# Patient Record
Sex: FEMALE | Race: WHITE
[De-identification: names, ages, dates, MRNs, and addresses within clinical notes are randomized per-mention and may not be internally consistent; named-entity substitution may affect disease eponyms.]

---

## 2017-02-20 ENCOUNTER — HOSPITAL ENCOUNTER (OUTPATIENT)
Dept: HOSPITAL 39 - GMAB | Age: 52
Discharge: HOME | End: 2017-02-20
Attending: FAMILY MEDICINE
Payer: COMMERCIAL

## 2017-02-20 DIAGNOSIS — N39.0: Primary | ICD-10-CM

## 2017-09-19 ENCOUNTER — HOSPITAL ENCOUNTER (OUTPATIENT)
Dept: HOSPITAL 39 - GMAB | Age: 52
Discharge: HOME | End: 2017-09-19
Attending: FAMILY MEDICINE
Payer: COMMERCIAL

## 2017-09-19 DIAGNOSIS — Z00.01: Primary | ICD-10-CM

## 2019-01-22 ENCOUNTER — HOSPITAL ENCOUNTER (OUTPATIENT)
Dept: HOSPITAL 39 - LAB.O | Age: 54
End: 2019-01-22
Attending: NURSE PRACTITIONER
Payer: COMMERCIAL

## 2019-01-22 DIAGNOSIS — I10: Primary | ICD-10-CM

## 2019-12-17 ENCOUNTER — HOSPITAL ENCOUNTER (EMERGENCY)
Dept: HOSPITAL 39 - ER | Age: 54
Discharge: HOME | End: 2019-12-17
Payer: COMMERCIAL

## 2019-12-17 VITALS — SYSTOLIC BLOOD PRESSURE: 159 MMHG | DIASTOLIC BLOOD PRESSURE: 79 MMHG

## 2019-12-17 VITALS — TEMPERATURE: 97.4 F | OXYGEN SATURATION: 100 %

## 2019-12-17 DIAGNOSIS — N20.0: ICD-10-CM

## 2019-12-17 DIAGNOSIS — N28.1: ICD-10-CM

## 2019-12-17 DIAGNOSIS — I34.1: ICD-10-CM

## 2019-12-17 DIAGNOSIS — R10.84: Primary | ICD-10-CM

## 2019-12-17 DIAGNOSIS — J45.909: ICD-10-CM

## 2019-12-17 DIAGNOSIS — I10: ICD-10-CM

## 2019-12-17 DIAGNOSIS — Z85.3: ICD-10-CM

## 2019-12-17 DIAGNOSIS — Z79.899: ICD-10-CM

## 2019-12-17 PROCEDURE — 74177 CT ABD & PELVIS W/CONTRAST: CPT

## 2019-12-17 PROCEDURE — 36415 COLL VENOUS BLD VENIPUNCTURE: CPT

## 2019-12-17 PROCEDURE — 85025 COMPLETE CBC W/AUTO DIFF WBC: CPT

## 2019-12-17 PROCEDURE — 83690 ASSAY OF LIPASE: CPT

## 2019-12-17 PROCEDURE — 81001 URINALYSIS AUTO W/SCOPE: CPT

## 2019-12-17 PROCEDURE — 80053 COMPREHEN METABOLIC PANEL: CPT

## 2019-12-17 NOTE — ED.PDOC
History of Present Illness





- General


Chief Complaint: Abdominal Pain


Stated Complaint: abd cramping x3 hrs


Time Seen by Provider: 12/17/19 20:27


Information Source: patient, family


Exam Limitations: no limitations





- History of Present Illness


Initial Comments: 





53 yo F with PMH sig for chronic constipation on linzess and bentyl daily who 

presents for diffuse abd pain described as cramping onset this evening, 

worsening since, severe, constant, no radiation. Similar pain in past with 

diverticulitis. Last bowel movement this morning. Denies f/c, cough, congestion,

CP, SOB, n/v/d, urinary sx. 





Review of Systems





- Review of Systems


Constitutional: Denies: chills, fever


EENTM: States: no symptoms reported


Respiratory: Denies: cough, short of breath


Cardiology: Denies: chest pain, palpitations


Gastrointestinal/Abdominal: States: abdominal pain, constipation.  Denies: 

diarrhea, nausea, vomiting


Genitourinary: Denies: dysuria, frequency, hematuria


Musculoskeletal: Denies: back pain, neck pain


Skin: Denies: lesions, rash


Neurological: Denies: headache, numbness, weakness





Past Medical History (General)





- Patient Medical History


Hx Seizures: No


Hx Stroke: No


Hx Dementia: No


Hx Asthma: Yes - seasonal: asthma


Hx of COPD: No


Hx Cardiac Disorders: Yes - mitral valve prolapse


Hx Congestive Heart Failure: No


Hx Pacemaker: No


Hx Hypertension: Yes


Hx Thyroid Disease: No


Hx Diabetes: No


Hx Gastroesophageal Reflux: No


Hx Renal Disease: No


Hx Cancer: Yes - breast


Hx of HIV: No


Hx Hepatitis C: No


Hx MRSA: No





- Vaccination History


Hx Tetanus, Diphtheria Vaccination: No


Hx Influenza Vaccination: Yes


Hx Pneumococcal Vaccination: No





- Social History


Hx Tobacco Use: No


Hx Alcohol Use: No


Hx Substance Use: No


Hx Substance Use Treatment: No


Hx Depression: No


Hx Physical Abuse: No


Hx Emotional Abuse: No


Hx Suspected Abuse: No





- Female History


Patient Pregnant: No





Family Medical History





- Family History


  ** Mother


Living Status: Still Living


Hx Family Cancer: Yes - breast





Physical Exam





- Physical Exam


General Appearance: Alert, No apparent distress, Well Developed, Well Nourished,

Other - Appears in pain


Eyes, Ears, Nose, Throat Exam: normal ENT inspection


Neck: full range of motion, supple


Respiratory: lungs clear, normal breath sounds, no respiratory distress, no 

accessory muscle use


Cardiovascular/Chest: normal peripheral pulses, regular rate, rhythm, no edema, 

no gallop, no JVD, no murmur


Peripheral Pulses: No deficit


Gastrointestinal/Abdominal: normal bowel sounds, soft, no organomegaly, no 

pulsatile mass, tenderness - diffuse


Back Exam: no CVA tenderness


Extremity: normal range of motion, no pedal edema


Neurologic: no motor/sensory deficits, alert, normal mood/affect, other - 

grossly intact


Skin Exam: normal color, warm/dry





Progress





- Progress


Progress: 


I have explained and reviewed all results with the pt. Pt is feeling improved, 

comfortable with d/c home. I explained that emergent conditions may


arise and to return to the ER for new, worsening, or any persistent conditions. 

I've explained the importance of f/u for recheck. All questions and concerns 

addressed at this time. Pt understands and agrees with plan. Pt well appearing, 

NAD, is stable for discharge.





Liza Manzanares MD


Emergency Medicine Physician


Billing Number [id number] 





- Results/Orders


Results/Orders: 





                                        





12/17/19 20:43


Hold Metformin x 48Hrs [id number] 








                         Laboratory Results - last 24 hr











  12/17/19 12/17/19 12/17/19





  20:39 20:39 20:39


 


WBC  5.1  


 


RBC  4.36  


 


Hgb  13.5  


 


Hct  40.1  


 


MCV  92.0  


 


MCH  31.0  


 


MCHC  33.7  


 


RDW  12.7  


 


Plt Count  226  


 


MPV  8.2  


 


Absolute Neuts (auto)  3.30  


 


Absolute Lymphs (auto)  1.30  


 


Absolute Monos (auto)  0.40  


 


Absolute Eos (auto)  0.10  


 


Absolute Basos (auto)  0.00  


 


Neutrophils %  63.9  


 


Lymphocytes %  25.9  


 


Monocytes %  7.8  


 


Eosinophils %  1.5  


 


Basophils %  0.9  


 


Sodium   134 L 


 


Potassium   3.3 L 


 


Chloride   99 L 


 


Carbon Dioxide   26 


 


Anion Gap   12.3 


 


BUN   13 


 


Creatinine   0.73 


 


BUN/Creatinine Ratio   17.8 


 


Random Glucose   123 H 


 


Serum Osmolality   269.7 L 


 


Calcium   9.5 


 


Total Bilirubin   0.6 


 


AST   28 


 


ALT   22 


 


Alkaline Phosphatase   64 


 


Serum Total Protein   6.8 


 


Albumin   4.4 


 


Globulin   2.4 


 


Albumin/Globulin Ratio   1.8 


 


Lipase    39


 


Urine Color   


 


Urine Appearance   


 


Urine pH   


 


Ur Specific Gravity   


 


Urine Protein   


 


Urine Glucose (UA)   


 


Urine Ketones   


 


Urine Blood   


 


Urine Nitrite   


 


Urine Bilirubin   


 


Urine Urobilinogen   


 


Ur Leukocyte Esterase   


 


Urine RBC   


 


Urine WBC   


 


Ur Epithelial Cells   


 


Urine Bacteria   














  12/17/19





  20:40


 


WBC 


 


RBC 


 


Hgb 


 


Hct 


 


MCV 


 


MCH 


 


MCHC 


 


RDW 


 


Plt Count 


 


MPV 


 


Absolute Neuts (auto) 


 


Absolute Lymphs (auto) 


 


Absolute Monos (auto) 


 


Absolute Eos (auto) 


 


Absolute Basos (auto) 


 


Neutrophils % 


 


Lymphocytes % 


 


Monocytes % 


 


Eosinophils % 


 


Basophils % 


 


Sodium 


 


Potassium 


 


Chloride 


 


Carbon Dioxide 


 


Anion Gap 


 


BUN 


 


Creatinine 


 


BUN/Creatinine Ratio 


 


Random Glucose 


 


Serum Osmolality 


 


Calcium 


 


Total Bilirubin 


 


AST 


 


ALT 


 


Alkaline Phosphatase 


 


Serum Total Protein 


 


Albumin 


 


Globulin 


 


Albumin/Globulin Ratio 


 


Lipase 


 


Urine Color  Yellow


 


Urine Appearance  Clear


 


Urine pH  7.5


 


Ur Specific Gravity  1.020


 


Urine Protein  Negative


 


Urine Glucose (UA)  Negative


 


Urine Ketones  Negative


 


Urine Blood  Trace-intact H


 


Urine Nitrite  Negative


 


Urine Bilirubin  Negative


 


Urine Urobilinogen  0.2


 


Ur Leukocyte Esterase  Negative


 


Urine RBC  0-1


 


Urine WBC  0


 


Ur Epithelial Cells  0-1


 


Urine Bacteria  Rare








CT abd/pelvis: 


EXAM DESCRIPTION:  Abdomen/Pelvis w/Contrast  CLINICAL HISTORY:  54 years Female

pain  COMPARISON:  July 8, 2016.  TECHNIQUE:  Images were obtained in axial, 

sagittal, and coronal planes. Intravenous contrast was administered.  This exam 

was performed according to our departmental dose-optimization program which 

includes use of Automated Exposure Control, adjustment of the mA and/or kV 

according to patient size and/or use of iterative reconstruction technique.  

FINDINGS:  No abnormality involving the liver, spleen, pancreas, gallbladder, or

adrenal glands bilaterally.  No obstructing renal calcifications bilaterally. No

hydronephrosis bilaterally. Right renal cysts. Punctate nonobstructing 

calcifications left kidney. Unremarkable bladder.  No dilatation abdominal 

aorta. No abnormality portal vein. No adenopathy or abnormal fluid collections 

seen.  Appendix within normal limits. No bowel obstruction, perforation, or 

inflammation. No acute osseous abnormality.  No abnormality lower lungs 

bilaterally.    IMPRESSION:  No acute intra-abdominal abnormality.  

Electronically signed by: Anabela Christine MD 12/17/2019 9:25 PM CST Workstation: 

[id number]





                               Vital Signs - 24 hr











  12/17/19 12/17/19 12/17/19





  20:33 21:43 22:00


 


Temperature 97.4 F L  


 


Pulse Rate [ 60 64 66





left]   


 


Respiratory 18 18 





Rate   


 


Blood Pressure 189/106 162/87 159/79





[left]   


 


O2 Sat by Pulse 100 100 





Oximetry   














  12/17/19





  22:21


 


Temperature 97.4 F L


 


Pulse Rate [ 66





left] 


 


Respiratory 18





Rate 


 


Blood Pressure 159/79





[left] 


 


O2 Sat by Pulse 100





Oximetry 














Departure





- Departure


Clinical Impression: 


 Acute abdominal pain





Time of Disposition: 22:09


Disposition: Discharge to Home or Self Care


Health Concerns: 


condition: stable


Departure Forms:  ED Discharge - Pt. Copy, Patient Portal Self Enrollment


Instructions:  DI for Abdominal Pain-Adult


Referrals: 


PANCHO YUAN MD [Primary Care Provider] - 1-2 Days


Home Medications: 


Ambulatory Orders





Calcium 2,000 mg PO DAILY 09/14/14 


RX: ALPRAZolam [Xanax] 0.5 mg PO TID 09/14/14 


RX: Enalapril Maleate [Vasotec] 10 mg PO DAILY 09/14/14 


Vitamin D 1 each PO DAILY 09/14/14 


RX: Linaclotide [Linzess] 145 mcg PO DAILY PRN 07/09/16 


Omeprazole Magnesium [Prilosec Otc] 20 mg PO QAM #30 tab 07/10/16 


RX: Bifidobacterium Infantis [Align] 4 mg PO BID #60 cap 07/10/16 


RX: Dicyclomine HCl [Bentyl] 10 mg PO TID #60 tab 07/10/16 


RX: HYDROcodone 5MG/APAP 325MG [Norco 5/325] 1 ea PO Q4H PRN #0 tab 07/10/16 








Additional Instructions: 


Follow up:


[facility]


As needed, if symptoms worsen

## 2019-12-17 NOTE — CT
EXAM DESCRIPTION:



 Abdomen/Pelvis w/Contrast



CLINICAL HISTORY: 



54 years  Female  pain



COMPARISON: 



July 8, 2016.



TECHNIQUE: 



Images were obtained in axial, sagittal, and coronal planes.

Intravenous contrast was administered.



This exam was performed according to our departmental

dose-optimization program which includes use of Automated

Exposure Control, adjustment of the mA and/or kV according to

patient size and/or use of iterative reconstruction technique. 



FINDINGS: 



No abnormality involving the liver, spleen, pancreas,

gallbladder, or adrenal glands bilaterally.



No obstructing renal calcifications bilaterally. No

hydronephrosis bilaterally. Right renal cysts. Punctate

nonobstructing calcifications left kidney. Unremarkable bladder.



No dilatation abdominal aorta. No abnormality portal vein. No

adenopathy or abnormal fluid collections seen.



Appendix within normal limits. No bowel obstruction, perforation,

or inflammation. No acute osseous abnormality.



No abnormality lower lungs bilaterally.







IMPRESSION: 



No acute intra-abdominal abnormality.



Electronically signed by:  Anabela Christine MD  12/17/2019 9:25 PM CST

Workstation: [id number]

## 2019-12-19 ENCOUNTER — HOSPITAL ENCOUNTER (INPATIENT)
Dept: HOSPITAL 39 - ER | Age: 54
LOS: 4 days | Discharge: HOME | DRG: 389 | End: 2019-12-23
Attending: NURSE PRACTITIONER | Admitting: NURSE PRACTITIONER
Payer: COMMERCIAL

## 2019-12-19 DIAGNOSIS — R65.10: ICD-10-CM

## 2019-12-19 DIAGNOSIS — E78.5: ICD-10-CM

## 2019-12-19 DIAGNOSIS — I10: ICD-10-CM

## 2019-12-19 DIAGNOSIS — J45.909: ICD-10-CM

## 2019-12-19 DIAGNOSIS — K56.609: Primary | ICD-10-CM

## 2019-12-19 DIAGNOSIS — N17.9: ICD-10-CM

## 2019-12-19 DIAGNOSIS — K52.9: ICD-10-CM

## 2019-12-19 DIAGNOSIS — E86.0: ICD-10-CM

## 2019-12-19 DIAGNOSIS — D72.829: ICD-10-CM

## 2019-12-19 DIAGNOSIS — I34.1: ICD-10-CM

## 2019-12-19 DIAGNOSIS — M50.30: ICD-10-CM

## 2019-12-19 DIAGNOSIS — Z85.3: ICD-10-CM

## 2019-12-19 RX ADMIN — POTASSIUM CHLORIDE, DEXTROSE MONOHYDRATE AND SODIUM CHLORIDE PRN MLS/HR: 150; 5; 900 INJECTION, SOLUTION INTRAVENOUS at 20:29

## 2019-12-19 RX ADMIN — Medication SCH: at 20:01

## 2019-12-19 RX ADMIN — PROMETHAZINE HYDROCHLORIDE PRN MLS/HR: 25 INJECTION INTRAMUSCULAR; INTRAVENOUS at 20:10

## 2019-12-19 RX ADMIN — HYDROMORPHONE HYDROCHLORIDE PRN MG: 2 INJECTION, SOLUTION INTRAMUSCULAR; INTRAVENOUS; SUBCUTANEOUS at 21:30

## 2019-12-19 RX ADMIN — ENOXAPARIN SODIUM SCH MG: 40 INJECTION, SOLUTION INTRAVENOUS; SUBCUTANEOUS at 20:01

## 2019-12-19 RX ADMIN — PANTOPRAZOLE SODIUM SCH MG: 40 INJECTION, POWDER, FOR SOLUTION INTRAVENOUS at 17:15

## 2019-12-19 RX ADMIN — PIPERACILLIN SODIUM AND TAZOBACTAM SODIUM SCH MLS/HR: 3; .375 INJECTION, POWDER, LYOPHILIZED, FOR SOLUTION INTRAVENOUS at 22:17

## 2019-12-19 NOTE — HP
SUPERVISING PHYSICIAN:  Abram Persaud M.D.



CHIEF COMPLAINT:  Abdominal pain with nausea and vomiting.



HISTORY OF PRESENT ILLNESS:  This is a 54 year-old female patient who came to 
the Emergency Room secondary to nausea and vomiting with abdominal pain for 24 
to 36 hours.  She was actually seen in the Emergency Room here on the  with 
some abdominal pain but was not having any vomiting at that time.  She had an 
extensive workup including labs and CT that were unremarkable.  She presented 
back to the Emergency Room today secondary to increasing abdominal pain as well 
as nausea and vomiting.  She does not have any history of bowel obstruction.  
She does have a history of significant constipation.  She has seen a 
gastroenterologist in the past, but showed no definitive pathology.  She has a 
past history of breast cancer with a left mastectomy with no recurring issues.  
Initially in the Emergency Room her vital signs were temperature 96.6, heart 
rate 83, blood pressure 154/106, respiratory rate 20, O2 saturation 99%.  Lab 
was done that showed a WBC of 15,500 with hemoglobin 17.9, hematocrit 53.4.  D-
dimer was negative at 0.19.  Sodium was low at 125 with potassium 3.8, chloride 
81, BUN 42, creatinine 1.47.  Blood glucose was 137 with serum osmolality of 
264.1, lactic acid 3.9, calcium 10.3.  Total bilirubin was 1.1 with AST of 70, 
creatinine kinase 673, CK-MB 24.9.  Troponin was elevated at 0.2 but she had no 
chest pain or EKG changes.  Urinalysis was unremarkable.  Blood cultures were 
drawn.  Influenza A and B via PCR were both negative.  Abdominal x-ray showed 
nondistended loops of small bowel with air-fluid levels, may be related to 
enteritis. Developing bowel obstruction not completely excluded.  



Abdomen and pelvis CT shows:

1.   Abnormal small bowel pattern is observed consistent with small bowel 
obstruction.

2.   Small nonobstructing left renal calculi are observed.

3.   Uncomplicated diverticulosis of the colon.

4.   Bilateral L5 spondylosis with grade 1 spondylolisthesis.



She was given some Zosyn and several liters of fluids in the Emergency Room as 
well as some Promethazine for her nausea.  Dr. Behr was consulted by the 
Emergency Room physician and he agreed for consultation on her admission.  I was
called for hospital admission.



PAST MEDICAL HISTORY: 

1.   Seasonal allergies.

2.   Migraine headaches.

3.   Hyperlipidemia.

4.   Hypertension.

5.   Palpitations.

6.   Cervical disc disease.

7.   Left breast cancer that has resolved.



PAST SURGICAL HISTORY:

1.    section.

2.   Hysterectomy.

3.   Hernia repair in .

4.   Left mastectomy.



OUTPATIENT MEDICATIONS:  

1.   Alprazolam.

2.   Calcium.

3.   Dicyclomine.

4.   Enalapril.

5.   Linzess.

6.   Vitamin D.



ALLERGIES:  NO KNOWN DRUG ALLERGIES.



FAMILY HISTORY:  Noncontributory.



SOCIAL HISTORY:  She is .  She has 2 children.  She is employed at 
[employer].  She denies any smoking or illicit 
drug use.  She does drink alcohol on a social basis.



REVIEW OF SYSTEMS:  

GENERAL:  Positive for fatigue.  Negative for fever or weight changes.

HEENT:  Negative for sinus symptoms, ear pain, vision changes or sore throat.  

RESPIRATORY:  Negative for wheezing, coughing or shortness of breath.  

CARDIAC:  Negative for chest pain, palpitations or tachycardia.  

GASTROINTESTINAL:  As per History of Present Illness.

GENITOURINARY:  Negative for hematuria, dysuria or polyuria.

MUSCULOSKELETAL:  Positive for muscle cramping.  Negative for arthralgias or 
myalgias.  

SKIN:  Negative for lesions or rashes.  

NEUROLOGIC:  Negative for headache, dizziness or seizures.  



PHYSICAL EXAMINATION: 



VITAL SIGNS:  Temperature 97.8, heart rate 90, blood pressure 155/90, 
respiratory rate 15, O2 saturation 96% on room air.



GENERAL:  This is a  54 year-old female patient lying in her hospital bed.  She 
looks to be moderately ill.



HEENT:  Normocephalic, atraumatic.  Pupils are equal and reactive.  Oropharynx 
is clear.  Oral mucous membranes are very dry.



NECK:  Supple without mass. 



RESPIRATORY: Essentially clear to auscultation bilaterally.  



CHEST:  There is equal rise and fall of the chest with inspiration and 
expiration.  



CARDIOVASCULAR:  Regular rate and rhythm.  



GASTROINTESTINAL: Abdomen is soft, nondistended.  It is diffusely tender in all 
four quadrants, especially in the left upper and right upper quadrants.  There 
is no rebound tenderness or guarding.  Bowel sounds are positive.  



EXTREMITIES:  No cyanosis, clubbing or edema.  



SKIN:  Warm and dry, although she has poor skin turgor.



NEUROLOGIC: Awake, alert and oriented times three.  Cranial nerves II-XII are 
grossly intact as tested.



LABORATORY:  Labs and films are as per the History of Present Illness.



ASSESSMENT: 

1.   Small bowel obstruction.

2.   Sudden inflammatory response syndrome secondary to possible enteritis.  She

      has admitting WBCs of 15,500 and elevated lactic acid.

3.   Dehydration secondary to nausea and vomiting as well as #1.

4.   Hypertension.

5.   Chronic cervical neck pain.

6.   Hyperlipidemia.

7.   History of breast cancer with left mastectomy.



PLAN:  The patient has been admitted to the hospital.  She will continue on her 
Zosyn as ordered in the Emergency Room.  Will give her fluids overnight.  Dr. Behr has been consulted and her GI orders will defer to him.  She will be on 
bowel rest.  She has antiemetics for nausea.  She is also on a proton pump 
inhibitor for ulcer prophylaxis as well as Lovenox for deep venous thrombosis 
prophylaxis.  At this point, she has not required any pain medications.  Will 
follow that as needed.  Routine lab has been ordered for in the morning as well 
as an abdominal x-ray.  She will have incentive spirometry.  Will follow her 
cultures as they become available.  Will continue to monitor closely and follow 
as needed.



#[id number]

[facility]

## 2019-12-19 NOTE — RAD
Procedure:  XR ABDOMEN SUPINE AND ERECT WITH CHEST (ABD ACUTE

SERIES)        



Exam Date:  12/19/2019



Ordering Provider:  Edy Kessler



Clinical Indication:  abd pain, nv



Comparison: 12/17/2019 CT abdomen pelvis



Findings: 



Upright chest x-ray: 

Cardiomediastinal silhouette is unremarkable.

Pulmonary vasculature is unremarkable.

There is no consolidation or effusion.

No pneumothorax.



Flat and upright abdomen: 

Nondistended loops of bowel with air-fluid levels. 

There is no pneumoperitoneum. 

There are no suspicious calcifications. 

No acute osseous abnormalities.



Impression:

1. Nondistended loops of small bowel with air-fluid levels may be

related to enteritis, developing bowel obstruction not completely

excluded.



Electronically signed by:  Tien Franklin MD  12/19/2019 1:43 PM CST

Workstation: [id number]

## 2019-12-19 NOTE — CT
EXAM DESCRIPTION:

Abdoment/Pelvis w/o Contrast



CLINICAL HISTORY:

54 years Female, worsening abd pain, nv, leukocytosis



COMPARISON:

17 December 2019



TECHNIQUE:

Transaxial images were obtained without intravenous or oral

contrast media. Sagittal and coronal reconstruction was

performed.This exam was performed according to our departmental

dose-optimization program, which includes automated exposure

control, adjustment of the mA and/or kV according to patient size

and/or use of iterative reconstruction technique.







FINDINGS:

The lung bases are clear. The liver and spleen are unremarkable.

No biliary ductal dilatation is observed. No adrenal masses are

detected. The pancreas is normal in appearance. Imaging of the

right kidney reveals no evidence of hydronephrosis mass cyst or

calcification. Imaging of the left kidney reveals 3 small stones

the largest of which measures 3.9 mm in diameter. No

hydronephrosis or mass is detected. Marked distention of the

stomach and proximal small bowel is observed. The appearance is

that of a small bowel obstruction. Minimal diverticulosis of the

colon is observed without evidence of diverticulitis.

Degenerative changes are observed in the lower lumbar spine. A

bilateral L5 spondylolysis is observed with grade 1

spondylolisthesis. Patient is post hysterectomy.



IMPRESSION:



1. An abnormal small bowel pattern is observed consistent with

small bowel obstruction.

2. Small nonobstructing left renal calculi are observed.

3. Uncomplicated diverticulosis of the colon.

4. Bilateral L5 spondylolysis with grade 1 spondylolisthesis.



Electronically signed by:  Beto Lau MD  12/19/2019 3:14 PM

CST Workstation: [id number]

## 2019-12-19 NOTE — ED.PDOC
History of Present Illness





- General


Chief Complaint: Abdominal Pain


Stated Complaint: abd pain with n/v


Time Seen by Provider: 12/19/19 13:07


Source: patient


Exam Limitations: no limitations





- History of Present Illness


Initial Comments: 





the patient is a 54-year-old  female presenting to the emergency room 

secondary to nausea vomiting abdominal pain for the last 24-36 hours.  She was 

actually seen here in the emergency room on the 17th for some abdominal pain but

was not having any vomiting at that time.  A rather extensive workup including 

labs and CT scan were done at that time that were grossly normal.  The patient 

presents today secondary to the vomiting and increasing pain.  No history of any

bowel obstruction.  She does have a history of significant constipation and 

irritable bowel.  She has apparently seen gastroenterology in the [facility] and 

had a significant workup recently not showing any other definitive pathology.  

No fever.  No chest pain.  She does have a history of breast cancer that is 

apparently resolved.  The patient is pleasant and cooperative.  Diffuse 

abdominal discomfort palpation and mild to moderate distention.  No guarding.  N

o real point tenderness.


Timing/Duration: 24 hours


Severity: moderate


Improving Factors: nothing


Worsening Factors: eating


Associated Symptoms: loss of appetite, malaise, nausea/vomiting


Allergies/Adverse Reactions: 


Allergies





NO KNOWN ALLERGY Allergy (Verified 12/19/19 13:24)


   





Home Medications: 


Ambulatory Orders





ALPRAZolam [Xanax] 0.5 mg PO TID 09/14/14 


Calcium 2,000 mg PO DAILY 09/14/14 


Enalapril Maleate [Vasotec] 10 mg PO DAILY 09/14/14 


Vitamin D 1 each PO DAILY 09/14/14 


Linaclotide [Linzess] 145 mcg PO DAILY PRN 07/09/16 


Bifidobacterium Infantis [Align] 4 mg PO BID #60 cap 07/10/16 


Dicyclomine HCl [Bentyl] 10 mg PO TID #60 tab 07/10/16 


HYDROcodone 5MG/APAP 325MG [Norco 5/325] 1 ea PO Q4H PRN #0 tab 07/10/16 


Omeprazole Magnesium [Prilosec Otc] 20 mg PO QAM #30 tab 07/10/16 











Review of Systems





- Review of Systems


Constitutional: States: malaise, weakness - generalized


EENTM: States: no symptoms reported


Respiratory: States: no symptoms reported


Cardiology: States: no symptoms reported


Gastrointestinal/Abdominal: States: see HPI


Genitourinary: States: no symptoms reported


Musculoskeletal: States: other - increased muscle cramps


Skin: States: no symptoms reported


Neurological: States: no symptoms reported


Endocrine: States: no symptoms reported


All other Systems: No Change from Baseline





Past Medical History (General)





- Patient Medical History


Hx Seizures: No


Hx Stroke: No


Hx Dementia: No


Hx Asthma: Yes - seasonal: asthma


Hx of COPD: No


Hx Cardiac Disorders: Yes - mitral valve prolapse


Hx Congestive Heart Failure: No


Hx Pacemaker: No


Hx Hypertension: Yes


Hx Thyroid Disease: No


Hx Diabetes: No


Hx Gastroesophageal Reflux: No


Hx Renal Disease: No


Hx Cancer: Yes - breast left


Hx of HIV: No


Hx Hepatitis C: No


Hx MRSA: No


Surgical History: Hysterectomy





- Vaccination History


Hx Tetanus, Diphtheria Vaccination: Yes


Hx Influenza Vaccination: Yes


Hx Pneumococcal Vaccination: No


Immunizations Up to Date: Yes





- Social History


Hx Tobacco Use: No


Hx Alcohol Use: Yes - occ


Hx Substance Use: No


Hx Substance Use Treatment: No


Hx Depression: No


Hx Physical Abuse: No


Hx Emotional Abuse: No


Hx Suspected Abuse: No





- Female History


Patient is a Female of Child Bearing Age (10 -59 yrs old): No


Patient Pregnant: No





Family Medical History





- Family History


  ** Mother


Living Status: Still Living


Hx Family Cancer: Yes - breast





Physical Exam





- Physical Exam


General Appearance: Alert, No apparent distress


Eye Exam: bilateral normal


Ears, Nose, Throat: hearing grossly normal, normal pharynx


Neck: full range of motion, supple


Respiratory: lungs clear, normal breath sounds, no respiratory distress, no 

accessory muscle use


Cardiovascular/Chest: normal peripheral pulses, regular rate, rhythm, no edema


Peripheral Pulses: radial,right: 2+, radial,left: 2+, dorsalis pedis,right: 2+, 

dorsalis pedis,left: 2+


Gastrointestinal/Abdominal: other - see history of present illness


Rectal Exam: deferred


Back Exam: no CVA tenderness, no vertebral tenderness


Extremity: normal range of motion, non-tender, normal inspection, no pedal 

edema, normal capillary refill


Neurologic: CNs II-XII nml as tested, alert, normal mood/affect, oriented x 3


Skin Exam: normal color


Comments: 





                               Vital Signs - 24 hr











  12/19/19 12/19/19





  13:19 14:00


 


Temperature 96.6 F L 


 


Pulse Rate [ 83 69





monitor]  


 


Respiratory 20 18





Rate  


 


Blood Pressure 154/106 158/103





[la]  


 


O2 Sat by Pulse 99 99





Oximetry  














Progress





- Progress


Progress: 





12/19/19 16:20


the patient a 54-year-old  female presenting to the emergency room with

 what appears to be a small bowel obstruction.  NG tube has been placed and 1200

 cc has already been obtained.  The patient is being given a of Zosyn primarily 

prophylactically.  The patient does have marked dehydration and will probably 

require 5-6 L of IV fluids.  She is receiving her second liter of IV fluids curr

ently and we will plan on adding some potassium to her IV fluids after this.  

Dr. Behr, general surgery has been contacted and will evaluate the patient 

later.  she is feeling somewhat better simply with NG tube decompression.  The 

patient does have numerous laboratory abnormalities that I believe are primarily

 due to dehydration.  These will need to be followed to make sure that they 

correct accordingly with rehydration.  admit for continued care.





- Results/Orders


Results/Orders: 


acute abdominal series shows some fluid-filled loops of bowel





CT scan of abdomen and pelvis without contrast shows what appears to be a small 

bowel obstruction.  No definitive other acute pathology.  See report for 

details.


                                Laboratory Tests











  12/19/19 12/19/19 12/19/19





  13:42 13:42 13:42


 


WBC   15.5 H D 


 


RBC   5.81 H D 


 


Hgb   17.9 H D 


 


Hct   53.4 H* D 


 


MCV   92.0 


 


MCH   30.9 


 


MCHC   33.6 


 


RDW   13.2 


 


Plt Count   353 


 


MPV   8.9 


 


Absolute Neuts (auto)   13.90 H 


 


Absolute Lymphs (auto)   0.90 L 


 


Absolute Monos (auto)   0.70 


 


Absolute Eos (auto)   0.00 


 


Absolute Basos (auto)   0.00 


 


Neutrophils %   89.5 H 


 


Lymphocytes %   5.5 L 


 


Monocytes %   4.7 


 


Eosinophils %   0.0 L 


 


Basophils %   0.3 


 


D-Dimer, Quantitative   


 


Sodium  125 L  


 


Potassium  3.8  


 


Chloride  81 L  


 


Carbon Dioxide  25  


 


Anion Gap  22.8 H  


 


BUN  42 H D  


 


Creatinine  1.47 H D  


 


BUN/Creatinine Ratio  28.6 H  


 


Random Glucose  137 H  


 


Serum Osmolality  264.1 L  


 


Lactic Acid    3.9 H*


 


Calcium  10.3 H  


 


Magnesium  2.4  


 


Total Bilirubin  1.1 H D  


 


AST  70 H D  


 


ALT  24  


 


Alkaline Phosphatase  80  D  


 


Creatine Kinase  673 H*  


 


CK-MB (CK-2)  24.9 H*  


 


CK-MB (CK-2) %  3.70  


 


Troponin I  0.20 H*  


 


Serum Total Protein  9.0 H D  


 


Albumin  5.4  


 


Globulin  3.6 H  


 


Albumin/Globulin Ratio  1.5  


 


Amylase  70  


 


Lipase  53 H D  


 


TSH  4.97  


 


Urine Color   


 


Urine Appearance   


 


Urine pH   


 


Ur Specific Gravity   


 


Urine Protein   


 


Urine Glucose (UA)   


 


Urine Ketones   


 


Urine Blood   


 


Urine Nitrite   


 


Urine Bilirubin   


 


Urine Urobilinogen   


 


Ur Leukocyte Esterase   


 


Urine RBC   


 


Urine WBC   


 


Ur Epithelial Cells   


 


Urine Bacteria   


 


Urine Mucus   














  12/19/19 12/19/19





  13:42 14:20


 


WBC  


 


RBC  


 


Hgb  


 


Hct  


 


MCV  


 


MCH  


 


MCHC  


 


RDW  


 


Plt Count  


 


MPV  


 


Absolute Neuts (auto)  


 


Absolute Lymphs (auto)  


 


Absolute Monos (auto)  


 


Absolute Eos (auto)  


 


Absolute Basos (auto)  


 


Neutrophils %  


 


Lymphocytes %  


 


Monocytes %  


 


Eosinophils %  


 


Basophils %  


 


D-Dimer, Quantitative  0.19 


 


Sodium  


 


Potassium  


 


Chloride  


 


Carbon Dioxide  


 


Anion Gap  


 


BUN  


 


Creatinine  


 


BUN/Creatinine Ratio  


 


Random Glucose  


 


Serum Osmolality  


 


Lactic Acid  


 


Calcium  


 


Magnesium  


 


Total Bilirubin  


 


AST  


 


ALT  


 


Alkaline Phosphatase  


 


Creatine Kinase  


 


CK-MB (CK-2)  


 


CK-MB (CK-2) %  


 


Troponin I  


 


Serum Total Protein  


 


Albumin  


 


Globulin  


 


Albumin/Globulin Ratio  


 


Amylase  


 


Lipase  


 


TSH  


 


Urine Color   Yellow


 


Urine Appearance   Clear


 


Urine pH   5.5


 


Ur Specific Gravity   1.025


 


Urine Protein   30


 


Urine Glucose (UA)   Negative


 


Urine Ketones   Negative


 


Urine Blood   Trace-lysed H


 


Urine Nitrite   Negative


 


Urine Bilirubin   Negative


 


Urine Urobilinogen   0.2


 


Ur Leukocyte Esterase   Negative


 


Urine RBC   0-1


 


Urine WBC   0


 


Ur Epithelial Cells   5-10


 


Urine Bacteria   0


 


Urine Mucus   Small














Departure





- Departure


Clinical Impression: 


 Small bowel obstruction, Severe dehydration





Acute renal failure


Qualifiers:


 Acute renal failure type: unspecified Qualified Code(s): N17.9 - Acute kidney 

failure, unspecified





Disposition: Admit Patient


Condition: Serious


Departure Forms:  ED Discharge - Pt. Copy, Patient Portal Self Enrollment


Instructions:  DI for Abdominal Pain-Adult


Referrals: 


PANCHO YUAN MD [Primary Care Provider] - 1-2 Weeks


Home Medications: 


Ambulatory Orders





ALPRAZolam [Xanax] 0.5 mg PO TID 09/14/14 


Calcium 2,000 mg PO DAILY 09/14/14 


Enalapril Maleate [Vasotec] 10 mg PO DAILY 09/14/14 


Vitamin D 1 each PO DAILY 09/14/14 


Linaclotide [Linzess] 145 mcg PO DAILY PRN 07/09/16 


Bifidobacterium Infantis [Align] 4 mg PO BID #60 cap 07/10/16 


Dicyclomine HCl [Bentyl] 10 mg PO TID #60 tab 07/10/16 


HYDROcodone 5MG/APAP 325MG [Norco 5/325] 1 ea PO Q4H PRN #0 tab 07/10/16 


Omeprazole Magnesium [Prilosec Otc] 20 mg PO QAM #30 tab 07/10/16 











Decision To Admit





- Decistion To Admit


Decision to Admit Reason: Medical Nature


Decision to Admit Date: 12/19/19


Decision to Admit Time: 16:32

## 2019-12-19 NOTE — CONS
DATE OF CONSULTATION:  12/19/19



HISTORY OF PRESENT ILLNESS:  The patient is a 54 year-old female who presented 
to the Emergency Room today for 24 to 36 hours of vomiting and abdominal pain.  
She was seen in the Emergency Room 2 days ago with abdominal pain but had no 
vomiting.  CT scan at that time was unremarkable.  She has no history of blood 
per rectum, melenic stools or bloody vomitus.  Her vomitus has been bile 
colored.  There is no previous history of bowel obstruction.  She does have 
irritable bowel/constipation and takes Linzess for that.  She had her last 
colonoscopy approximately 2 years ago and was said to be unremarkable.  She did 
have a hospitalization 3 years ago for abdominal pain and etiology was not 
resolved at that time.



PAST MEDICAL HISTORY: 

1.   Carcinoma of the breast.

2.   Mitral valve prolapse.

3.   Asthma.



PAST SURGICAL HISTORY:  

1.   Hysterectomy with oophorectomy.

2.   Surgical treatment for cancer of the breast.



CURRENT MEDICATIONS:

1.   Alprazolam.

2.   Calcium.

3    Vasotec.

4.   Vitamin D.

5.   Linzess.

6.   Align.

7.   Bentyl.

8.   Hydrocodone.

9.   Prilosec.  



ALLERGIES:   NO KNOWN DRUG ALLERGIES.



FAMILY HISTORY:  Positive for breast cancer.



SOCIAL HISTORY:  The patient is the mother of 2.  She does not use tobacco and 
uses alcohol rarely.



PHYSICAL EXAMINATION: 



VITAL SIGNS:  Afebrile and normotensive.



GENERAL:  The patient is awake, alert, cooperative and in mild to moderate 
distress. 



HEENT:  Sclera are nonicteric.  Mucous membranes are dry.  She has a nasogastric
tube in the left nostril with bilious material within it.



NECK:  Without adenopathy.



BACK:  Without CVA tenderness.



CHEST:  She has equal breath sounds bilaterally.



ABDOMEN:  Soft, diffusely tender without masses.  Bowel sounds are present but 
decreased.



PELVIC AND RECTAL:  Examinations are deferred.



EXTREMITIES:  Without clubbing, cyanosis or edema.



LABORATORY:  White count 15,000, hemoglobin 17.9, platelet count 353,000, 89% 
neutrophils.  Sodium 125, potassium 3.8, BUN 42, creatinine 1.47, lactic acid 
3.9.  Total bilirubin 1.1, AST 70, alkaline phosphatase 80, , CK-MB 24.9, 
troponin 0.20.  Lipase 53.  Urinalysis is pending.  D-dimer is 0.19.  Urinalysis
now is noted to be specific gravity of 1.025, otherwise clear.  Small amount of 
urobilinogen and epithelial cells.



CT scan reveals dilated loops of small bowel with no transition point.  There is
minimal stool identified.  There is no free air and no free fluid identified. 



IMPRESSION:

1.   Small bowel obstruction of uncertain etiology.



PLAN:  Nasogastric suction and rehydration.  I believe most likely that the 
leukocytosis is from vomiting and the dehydration.  I will follow this patient 
with the hospitalist service.



#[id number]

[facility]

## 2019-12-20 RX ADMIN — PIPERACILLIN SODIUM AND TAZOBACTAM SODIUM SCH MLS/HR: 3; .375 INJECTION, POWDER, LYOPHILIZED, FOR SOLUTION INTRAVENOUS at 03:59

## 2019-12-20 RX ADMIN — HYDROMORPHONE HYDROCHLORIDE PRN MG: 2 INJECTION, SOLUTION INTRAMUSCULAR; INTRAVENOUS; SUBCUTANEOUS at 06:31

## 2019-12-20 RX ADMIN — POTASSIUM CHLORIDE, DEXTROSE MONOHYDRATE AND SODIUM CHLORIDE PRN MLS/HR: 150; 5; 450 INJECTION, SOLUTION INTRAVENOUS at 20:37

## 2019-12-20 RX ADMIN — PIPERACILLIN SODIUM AND TAZOBACTAM SODIUM SCH MLS/HR: 3; .375 INJECTION, POWDER, LYOPHILIZED, FOR SOLUTION INTRAVENOUS at 22:34

## 2019-12-20 RX ADMIN — Medication SCH ML: at 09:30

## 2019-12-20 RX ADMIN — PIPERACILLIN SODIUM AND TAZOBACTAM SODIUM SCH MLS/HR: 3; .375 INJECTION, POWDER, LYOPHILIZED, FOR SOLUTION INTRAVENOUS at 10:04

## 2019-12-20 RX ADMIN — Medication SCH: at 20:33

## 2019-12-20 RX ADMIN — POTASSIUM CHLORIDE, DEXTROSE MONOHYDRATE AND SODIUM CHLORIDE PRN MLS/HR: 150; 5; 900 INJECTION, SOLUTION INTRAVENOUS at 03:31

## 2019-12-20 RX ADMIN — HYDROMORPHONE HYDROCHLORIDE PRN MG: 2 INJECTION, SOLUTION INTRAMUSCULAR; INTRAVENOUS; SUBCUTANEOUS at 18:04

## 2019-12-20 RX ADMIN — POTASSIUM CHLORIDE, DEXTROSE MONOHYDRATE AND SODIUM CHLORIDE PRN MLS/HR: 150; 5; 450 INJECTION, SOLUTION INTRAVENOUS at 12:36

## 2019-12-20 RX ADMIN — ENOXAPARIN SODIUM SCH MG: 40 INJECTION, SOLUTION INTRAVENOUS; SUBCUTANEOUS at 20:32

## 2019-12-20 RX ADMIN — PANTOPRAZOLE SODIUM SCH MG: 40 INJECTION, POWDER, FOR SOLUTION INTRAVENOUS at 17:21

## 2019-12-20 RX ADMIN — PIPERACILLIN SODIUM AND TAZOBACTAM SODIUM SCH MLS/HR: 3; .375 INJECTION, POWDER, LYOPHILIZED, FOR SOLUTION INTRAVENOUS at 16:42

## 2019-12-20 RX ADMIN — HYDROMORPHONE HYDROCHLORIDE PRN MG: 2 INJECTION, SOLUTION INTRAMUSCULAR; INTRAVENOUS; SUBCUTANEOUS at 02:27

## 2019-12-20 NOTE — RAD
EXAM:

  XR Abdomen, 2 Views



CLINICAL HISTORY:

  The patient is 54 years old and is Female; sbo



TECHNIQUE:

  Frontal view of the abdomen/pelvis with upright view of the

abdomen.



COMPARISON:

  Abdomen radiograph December 19, 2019.



FINDINGS:

  INTRAPERITONEAL SPACE:  No free air.

  GASTROINTESTINAL TRACT:  Dilated air-filled small bowel loops

are noted centrally within the abdomen. Several air-fluid levels

are present.

  BONES/JOINTS:  Unremarkable.

  TUBES, LINES AND DEVICES:  Enteric tube is present with the tip

in the gastric fundus.



IMPRESSION:     

  Findings suggest a small bowel obstruction.



Electronically signed by:  Monica Ahuja MD  12/20/2019 6:48 AM

[facility] Workstation: [id number]

## 2019-12-21 RX ADMIN — Medication SCH ML: at 09:04

## 2019-12-21 RX ADMIN — ENOXAPARIN SODIUM SCH MG: 40 INJECTION, SOLUTION INTRAVENOUS; SUBCUTANEOUS at 20:15

## 2019-12-21 RX ADMIN — PROMETHAZINE HYDROCHLORIDE PRN MLS/HR: 25 INJECTION INTRAMUSCULAR; INTRAVENOUS at 12:08

## 2019-12-21 RX ADMIN — PIPERACILLIN SODIUM AND TAZOBACTAM SODIUM SCH MLS/HR: 3; .375 INJECTION, POWDER, LYOPHILIZED, FOR SOLUTION INTRAVENOUS at 03:52

## 2019-12-21 RX ADMIN — PROMETHAZINE HYDROCHLORIDE PRN MLS/HR: 25 INJECTION INTRAMUSCULAR; INTRAVENOUS at 00:00

## 2019-12-21 RX ADMIN — PANTOPRAZOLE SODIUM SCH MG: 40 INJECTION, POWDER, FOR SOLUTION INTRAVENOUS at 17:32

## 2019-12-21 RX ADMIN — POTASSIUM CHLORIDE, DEXTROSE MONOHYDRATE AND SODIUM CHLORIDE PRN MLS/HR: 150; 5; 450 INJECTION, SOLUTION INTRAVENOUS at 23:59

## 2019-12-21 RX ADMIN — PIPERACILLIN SODIUM AND TAZOBACTAM SODIUM SCH MLS/HR: 3; .375 INJECTION, POWDER, LYOPHILIZED, FOR SOLUTION INTRAVENOUS at 11:00

## 2019-12-21 RX ADMIN — HYDROMORPHONE HYDROCHLORIDE PRN MG: 2 INJECTION, SOLUTION INTRAMUSCULAR; INTRAVENOUS; SUBCUTANEOUS at 21:29

## 2019-12-21 RX ADMIN — PIPERACILLIN SODIUM AND TAZOBACTAM SODIUM SCH MLS/HR: 3; .375 INJECTION, POWDER, LYOPHILIZED, FOR SOLUTION INTRAVENOUS at 15:37

## 2019-12-21 RX ADMIN — PIPERACILLIN SODIUM AND TAZOBACTAM SODIUM SCH MLS/HR: 3; .375 INJECTION, POWDER, LYOPHILIZED, FOR SOLUTION INTRAVENOUS at 21:32

## 2019-12-21 RX ADMIN — Medication SCH: at 20:15

## 2019-12-21 RX ADMIN — POTASSIUM CHLORIDE, DEXTROSE MONOHYDRATE AND SODIUM CHLORIDE PRN MLS/HR: 150; 5; 450 INJECTION, SOLUTION INTRAVENOUS at 15:06

## 2019-12-21 RX ADMIN — HYDROMORPHONE HYDROCHLORIDE PRN MG: 2 INJECTION, SOLUTION INTRAMUSCULAR; INTRAVENOUS; SUBCUTANEOUS at 00:02

## 2019-12-21 NOTE — PN
DATE:  12/20/19



SUPERVISING PHYSICIAN:   Abram Persaud MD



SUBJECTIVE:  The patient has been ambulating.  She was given a soapsuds enema 
today and did not have a whole lot of results with it.  She continues to have 
some abdominal pain but she says they are not quite as severe on admission.  She
does have an NG tube remaining in place with intermittent suction and clamping, 
seems to be tolerating this okay.  She has not had any chest pain or shortness 
of breath.



OBJECTIVE:

VITAL SIGNS:  Temperature 98.4, pulse 72, blood pressure 138/83, respirations 
18, oxygen saturation 98% on room air.  I&O:  Negative balance of 796, weight 
down to 46.3 kg.  She has had 1600 out of her NG tube.  She has not yet had a 
bowel movement.

GENERAL:  While the patient still appears to be unwell but is in no obvious 
acute distress.  She is alert.

HEENT:  NG tube remains in place with no complications.

HEART:  Regular rate and rhythm.

ABDOMEN:  Soft, nondistended with continued diffuse tenderness noted in the mid 
to left upper quadrant.  No rebound tenderness, no point tenderness.  No 
guarding. Positive bowel sounds.

CHEST:  Lungs clear to auscultation bilaterally.

EXTREMITIES:  Without edema.

NEUROLOGIC:  Alert and oriented x 3.



LABORATORY:  CBC now shows a normal white count at 10,000, hemoglobin 15.2, 
hematocrit 45.0.  Platelet count to 251,000, differential does show a continued 
left shift.  Chemistries show improved sodium of 134, normal potassium at 4.7, 
BUN 24, creatinine now normalized at 0.93. Glucose 139, lactic acid now 
normalized at 1.8. Liver functions all within normal limits.  Troponin was not 
repeated, nor was the lipase.



MICROBIOLOGY:  Blood cultures remain negative at 24 hours. 



RADIOLOGY:  Repeat abdominal x-ray this morning per radiology interpretation, 
still findings suggestive of small bowel obstruction.



ASSESSMENT: 

1.   Small bowel obstruction With white count baseline levels and lactic acid 
normalized.

2.   Sudden inflammatory response syndrome secondary to possible enteritis.  She

      has admitting WBCs of 15,500 and elevated lactic acid, improving with NG 
tube placement and 

      fluids.

3.   Dehydration secondary to nausea and vomiting as well as #1.

4.   Hypertension.

5.   Chronic cervical neck pain.

6.   Hyperlipidemia.

7.   History of breast cancer with left mastectomy.



PLAN:  Will continue to follow the patient with Dr. Behr.  Will continue with 
Zosyn today and discuss need for antibiotics with Dr. Behr, further assessment 
in the morning.  She continues on bowel rest, antiemetics.  She has been 
ambulating and is encouraged to do so.  Will try a soapsuds enema x2 today and 
will repeat labs in the morning.  Again, will defer further management to Dr. Behr and hopefully if she continues to improve medically we can discharge her 
over the weekend, however, if not she may actually require subsequent 
intervention and we will talk to Dr. Behr about that in the morning.  Until we 
can transition him to outpatient management, we will continue to monitor and 
treat as needed.



#[id number]

[facility]

## 2019-12-21 NOTE — PN
DATE:  12/21/19



SUPERVISING PHYSICIAN:  Abram Persaud M.D.



SUBJECTIVE:  The patient is still having abdominal pains.  She notes that it is 
significantly decreased from yesterday.  She had 2 enemas with minimal results 
overnight.  She is still having quite a bit of output through her nasogastric 
tube between intermittent suction.  She denied any chest pain and any nausea.



OBJECTIVE:

VITAL SIGNS:  Afebrile with temperature 98.3, pulse 78, blood pressure 137/88, 
respirations 18, satting 98% on room air.  I's and O's showing a negative 
balance of 1500.  Weight is 46.4 kg.

GENERAL:  The patient appears to be in no acute distress, although she is ill-
appearing.

HEENT:  Nasogastric tube remains in good place with no complications.

HEART:  Regular.

ABDOMEN:  Soft, nondistended but continued tenderness in the mid to left upper 
quadrant.  No rebound.  No point tenderness.  No guarding.  Bowel sounds are 
active.

CHEST:  Lung sounds are clear bilaterally.

EXTREMITIES:  Without any edema.  

NEUROLOGIC:  She remains alert and oriented times three.



LABORATORY:  White count showing to be 6,500, hemoglobin 12.5, hematocrit 37.3, 
platelet count 200,000.  Differential now shows to be without a left shift.  
Chemistries are showing normal electrolytes.  BUN 12, creatinine 0.72, calcium 
8.1.  Blood sugar 109.  Liver functions are showing to be within normal limits 
now.  Albumin is at 3.2.



MICROBIOLOGY:  Blood cultures remain negative after 48 hours.



RADIOLOGY:  Abdominal x-ray per radiology interpretation shows stable small 
bowel obstruction.



ASSESSMENT: 

1.   Small bowel obstruction.

2.   Sudden inflammatory response syndrome secondary to possible enteritis.  She

      has admitting WBCs of 15,500 and elevated lactic acid, improving with NG 
tube placement and 

      fluids.

3.   Dehydration, resolved and stable with IV fluids.

4.   Hypertension.

5.   Chronic cervical neck pain.

6.   Hyperlipidemia.

7.   History of breast cancer with left mastectomy.



PLAN:  Will continue to follow the patient along with Dr. Behr.  She is still on
Zosyn, deep venous thrombosis prophylaxis and ambulation which she is doing 
multiple times throughout the day.  She has had several soap suds enemas.  Will 
try another one today.  The plan would be that if she certainly can medically 
resolve this, will do so, but however if she continues to show resistant to 
medical treatment and conservative measures, certainly will consideration for a 
surgical procedure at Dr. Behr's recommendations.  Until then will continue to 
monitor and treat as needed.



#[id number]

[facility]

## 2019-12-21 NOTE — RAD
EXAM:  XR Abdomen, 2 Views



CLINICAL HISTORY:  FU bowel obstruction



TECHNIQUE:  Frontal view of the abdomen/pelvis with upright view

of the abdomen.



COMPARISON:  12/20/2019.



FINDINGS:

  Limitations:  None.

  Intraperitoneal space:  No free air.

  Gastrointestinal tract:  There is stable small bowel distention

and layering fluid.

  Bones/joints:  Unremarkable.

  Tubes, lines and devices:  Nasogastric tube terminates in the

gastric body.



IMPRESSION:     

  Stable small bowel obstruction.



Electronically signed by:  Dejah Le MD  12/21/2019 8:07 AM

[facility] Workstation: [id number]

## 2019-12-22 RX ADMIN — PIPERACILLIN SODIUM AND TAZOBACTAM SODIUM SCH MLS/HR: 3; .375 INJECTION, POWDER, LYOPHILIZED, FOR SOLUTION INTRAVENOUS at 21:50

## 2019-12-22 RX ADMIN — PROMETHAZINE HYDROCHLORIDE PRN MLS/HR: 25 INJECTION INTRAMUSCULAR; INTRAVENOUS at 02:15

## 2019-12-22 RX ADMIN — PROMETHAZINE HYDROCHLORIDE PRN MLS/HR: 25 INJECTION INTRAMUSCULAR; INTRAVENOUS at 21:17

## 2019-12-22 RX ADMIN — Medication SCH ML: at 08:57

## 2019-12-22 RX ADMIN — PIPERACILLIN SODIUM AND TAZOBACTAM SODIUM SCH MLS/HR: 3; .375 INJECTION, POWDER, LYOPHILIZED, FOR SOLUTION INTRAVENOUS at 04:04

## 2019-12-22 RX ADMIN — HYDROMORPHONE HYDROCHLORIDE PRN MG: 2 INJECTION, SOLUTION INTRAMUSCULAR; INTRAVENOUS; SUBCUTANEOUS at 16:59

## 2019-12-22 RX ADMIN — POTASSIUM CHLORIDE, DEXTROSE MONOHYDRATE AND SODIUM CHLORIDE PRN MLS/HR: 150; 5; 450 INJECTION, SOLUTION INTRAVENOUS at 10:46

## 2019-12-22 RX ADMIN — PIPERACILLIN SODIUM AND TAZOBACTAM SODIUM SCH MLS/HR: 3; .375 INJECTION, POWDER, LYOPHILIZED, FOR SOLUTION INTRAVENOUS at 16:58

## 2019-12-22 RX ADMIN — PIPERACILLIN SODIUM AND TAZOBACTAM SODIUM SCH MLS/HR: 3; .375 INJECTION, POWDER, LYOPHILIZED, FOR SOLUTION INTRAVENOUS at 10:41

## 2019-12-22 RX ADMIN — PIPERACILLIN SODIUM AND TAZOBACTAM SODIUM SCH MLS/HR: 3; .375 INJECTION, POWDER, LYOPHILIZED, FOR SOLUTION INTRAVENOUS at 11:55

## 2019-12-22 RX ADMIN — Medication SCH: at 20:16

## 2019-12-22 RX ADMIN — PROMETHAZINE HYDROCHLORIDE PRN MLS/HR: 25 INJECTION INTRAMUSCULAR; INTRAVENOUS at 10:52

## 2019-12-22 RX ADMIN — PANTOPRAZOLE SODIUM SCH MG: 40 INJECTION, POWDER, FOR SOLUTION INTRAVENOUS at 16:58

## 2019-12-22 RX ADMIN — ENOXAPARIN SODIUM SCH MG: 40 INJECTION, SOLUTION INTRAVENOUS; SUBCUTANEOUS at 20:16

## 2019-12-22 NOTE — RAD
ABDOMEN, TWO VIEWS,  XR.



CLINICAL HISTORY: fu bowel obstruction



COMPARISON: Abdomen 12/21/2019



TECHNIQUE: Supine and upright AP abdomen.



FINDINGS:



Feeding tube identified in the left abdomen in the region of the

stomach . There is air noted throughout the bowel to the rectal

region. There is mild gaseous dilatation of several central small

bowel loops up to 3.2 cm. No obstruction evident. No free air. No

pathologic calcification evident.. 



IMPRESSION:



Mild small bowel ileus versus incomplete obstruction, without

significant change.



Electronically signed by:  Betty Verduzco DO  12/22/2019 6:33 AM

[facility] Workstation: [id number]

## 2019-12-22 NOTE — PN
DATE:  12/22/19



SUPERVISING  PHYSICIAN:  Abram Persaud M.D.



SUBJECTIVE:  The patient notes that her abdominal pain is just very dull.  She 
still has an nasogastric tube in  place.  She has been ambulating multiple times
throughout the day.  She has not had any other complaints.  No nausea or 
vomiting or diarrhea.



OBJECTIVE:

VITAL SIGNS:  T max temperature was 100.5, pulse 72, blood pressure 147/89, 
respirations 17, satting 100% on room air.

GENERAL:  The patient is resting comfortably, visiting with family.  Nasogastric
tube remains in place.  She is alert.

CHEST:  Lungs were clear to auscultation. 

HEART:  Regular rate and rhythm.

ABDOMEN:  Soft, nondistended with positive bowel sounds.  Still with a little 
tenderness on palpation to the epigastric region.

EXTREMITIES:  Without any edema.

NEUROLOGIC:  She is alert and oriented times three.



LABORATORY:  No additional laboratory studies today as they have normalized.



RADIOLOGY:  Repeat abdominal x-ray this morning per radiology interpretation 
showed mid small bowel ileus versus incomplete obstruction without significant 
change.



ASSESSMENT: 

1.   Small bowel obstruction.

2.   Sudden inflammatory response syndrome secondary to possible enteritis.  She

      has admitting WBCs of 15,500 and elevated lactic acid, improving with NG 
tube placement and 

      fluids.

3.   Dehydration, resolved and stable with IV fluids.

4.   Hypertension.

5.   Chronic cervical neck pain.

6.   Hyperlipidemia.

7.   History of breast cancer with left mastectomy.



PLAN:  Will continue to follow the patient along with Dr. Behr.  The plan at 
this point, I believe, is to start her on some clear liquids, give her some Milk
of Magnesia and hopefully be able to pull her nasogastric tube later today if 
she tolerates that regimen.  The goal would be to discharge tomorrow with 
conservative treatment measures over the holidays and reassess and followup 
clinically for need for possible surgical intervention.  She remains stable.  
Will continue to monitor and treat as needed with hopefully being able to 
discharge tomorrow.



#[id number]

[facility]

## 2019-12-23 VITALS — SYSTOLIC BLOOD PRESSURE: 132 MMHG | DIASTOLIC BLOOD PRESSURE: 92 MMHG | TEMPERATURE: 99 F | OXYGEN SATURATION: 100 %

## 2019-12-23 RX ADMIN — POTASSIUM CHLORIDE, DEXTROSE MONOHYDRATE AND SODIUM CHLORIDE PRN MLS/HR: 150; 5; 450 INJECTION, SOLUTION INTRAVENOUS at 06:12

## 2019-12-23 RX ADMIN — PIPERACILLIN SODIUM AND TAZOBACTAM SODIUM SCH MLS/HR: 3; .375 INJECTION, POWDER, LYOPHILIZED, FOR SOLUTION INTRAVENOUS at 10:55

## 2019-12-23 RX ADMIN — Medication SCH: at 08:50

## 2019-12-23 RX ADMIN — PIPERACILLIN SODIUM AND TAZOBACTAM SODIUM SCH MLS/HR: 3; .375 INJECTION, POWDER, LYOPHILIZED, FOR SOLUTION INTRAVENOUS at 03:55

## 2019-12-24 ENCOUNTER — HOSPITAL ENCOUNTER (INPATIENT)
Dept: HOSPITAL 39 - ER | Age: 54
LOS: 6 days | Discharge: HOME | DRG: 337 | End: 2019-12-30
Attending: NURSE PRACTITIONER | Admitting: NURSE PRACTITIONER
Payer: COMMERCIAL

## 2019-12-24 DIAGNOSIS — J30.2: ICD-10-CM

## 2019-12-24 DIAGNOSIS — Z85.3: ICD-10-CM

## 2019-12-24 DIAGNOSIS — I10: ICD-10-CM

## 2019-12-24 DIAGNOSIS — E86.0: ICD-10-CM

## 2019-12-24 DIAGNOSIS — G89.29: ICD-10-CM

## 2019-12-24 DIAGNOSIS — Z53.31: ICD-10-CM

## 2019-12-24 DIAGNOSIS — K56.51: Primary | ICD-10-CM

## 2019-12-24 DIAGNOSIS — E78.5: ICD-10-CM

## 2019-12-24 DIAGNOSIS — Z79.899: ICD-10-CM

## 2019-12-24 DIAGNOSIS — M54.2: ICD-10-CM

## 2019-12-24 PROCEDURE — BW211ZZ COMPUTERIZED TOMOGRAPHY (CT SCAN) OF ABDOMEN AND PELVIS USING LOW OSMOLAR CONTRAST: ICD-10-PCS

## 2019-12-24 RX ADMIN — ENOXAPARIN SODIUM SCH MG: 40 INJECTION, SOLUTION INTRAVENOUS; SUBCUTANEOUS at 22:05

## 2019-12-24 RX ADMIN — MORPHINE SULFATE PRN MG: 10 INJECTION INTRAVENOUS at 19:45

## 2019-12-24 RX ADMIN — PROMETHAZINE HYDROCHLORIDE PRN MLS/HR: 25 INJECTION INTRAMUSCULAR; INTRAVENOUS at 21:36

## 2019-12-24 RX ADMIN — POTASSIUM CHLORIDE, DEXTROSE MONOHYDRATE AND SODIUM CHLORIDE PRN MLS/HR: 150; 5; 450 INJECTION, SOLUTION INTRAVENOUS at 19:29

## 2019-12-24 NOTE — HP
SUPERVISING PHYSICIAN:  Abiodun Lugo M.D.



CHIEF COMPLAINT:  Abdominal pain.



HISTORY OF PRESENT ILLNESS:  This is a 54 year-old female patient who came to 
the Emergency Room, who was complaining of abdominal pain.  She had just been 
released from the hospital after a 4 day stay for a partial small bowel 
obstruction and treated conservatively with an nasogastric tube.  After going 
home she did fairly well, but the night prior to her admission to the hospital 
she had some bloating in her abdomen, and she did not have any bowel movements. 
She also had 1 episode of vomiting.  There were no fever or chills.  She had 
lots of abdominal cramps with abdominal pain.  Vital signs in the Emergency Room
showed temperature 98.4, heart rate 81, blood pressure 176/114, respiratory rate
18, O2 sat 98% on room air.  Lab studies were done and her CBC was unremarkable.
 Sodium was slightly low at 134 with chloride 94, lactic acid 1.5, serum 
osmolality 266.4, calcium 10.3 and AST was 45.  The remainder of her liver 
enzymes were within normal limits.  Urine did have 15 urine ketones and a trace 
of intact urine blood.  An nasogastric tube was placed and she was given some IV
fluids as well as some Phenergan.  Her abdominal CT showed persistent high grade
small bowel obstruction.  Dr. Behr was consulted.  He agreed that she should be 
put in the hospital and his orders were written and plans to take to surgery, 
most likely on Thursday.  I was called for hospital admission.



PAST MEDICAL HISTORY: 

1.   Seasonal allergies.

2.   Migraine headaches.

3.   Hyperlipidemia.

4.   Hypertension.

5.   Palpitations.

6.   Cervical disc disease.

7.   Left breast cancer that has resolved.



PAST SURGICAL HISTORY:

1.    section.

2.   Hysterectomy.

3.   Hernia repair in .

4.   Left mastectomy.



OUTPATIENT MEDICATIONS:  

1.   Alprazolam.

2.   Calcium.

3.   Dicyclomine.

4.   Enalapril.

5.   Linzess.

6.   Vitamin D.



ALLERGIES:  NO KNOWN DRUG ALLERGIES.



FAMILY HISTORY:  Noncontributory.



SOCIAL HISTORY:  She is .  She has 2 children.  She is employed at 
[employer].  She denies any smoking or illicit 
drug use.  She does drink alcohol on a social basis.



REVIEW OF SYSTEMS:  

GENERAL: Positive for fatigue.  Negative for fever or weight changes.

HEENT:  Negative for sinus symptoms, ear pain, vision changes or sore throat.  

RESPIRATORY:  Negative for wheezing, coughing or shortness of breath.  

CARDIAC:  Negative for chest pain, palpitations or tachycardia.  

GASTROINTESTINAL:  As per History of Present Illness.

GENITOURINARY:  Negative for hematuria, dysuria or polyuria.

MUSCULOSKELETAL:  Negative for arthralgias, myalgias.  

SKIN:  Negative for lesions or rashes.  

NEUROLOGIC: Negative for headache, dizziness or seizures.  



PHYSICAL EXAMINATION: 



VITAL SIGNS:  Temperature 97.1, heart rate 76, blood pressure 124/80, 
respiratory rate 16, O2 saturation 95% on room air.



GENERAL:  This is a 54 year-old female patient who is sitting up in her hospital
bed.  She looks to be moderately ill.



HEENT:  Normocephalic and atraumatic.  Pupils are equal and reactive.  
Oropharynx is clear.  Oral mucous membranes are very dry.



NECK:  Supple without mass.



RESPIRATORY:  Essentially clear to auscultation bilaterally.



CHEST:  There is equal rise and fall of the chest with inspiration and 
expiration.



CARDIAC:  Regular rate and rhythm.



GASTROINTESTINAL:  Abdomen is soft.  It is mildly distended.  It is diffusely 
tender in all four quadrants.  Most of her pain is in the epigastric and left 
upper quadrant.  There is no rebound tenderness but there is some mild guarding.
 Bowel sounds are positive but diminished.



EXTREMITIES:  No clubbing, cyanosis or edema.



SKIN:  Warm and dry.



NEUROLOGIC:  She is awake, alert and oriented times three.  Cranial nerves II-
XII are grossly intact as tested.



LABORATORY:  Labs and films are as per history of present illness.



ASSESSMENT: 

1.   Small bowel obstruction.

2.   Severe dehydration secondary to nausea and vomiting as well as #1.

3.   Hypertension.

4.   Chronic cervical neck pain.

5.   Hyperlipidemia.

6.   History of breast cancer with left mastectomy.



PLAN:  The patient has been admitted to the hospital.  She was given IV fluids. 
Dr. Behr has written orders that included a proton pump inhibitor for ulcer 
prophylaxis.  She will be NPO on bowel rest.  I have ordered Zofran as well as 
Phenergan for nausea.  She will also be on Lovenox for deep venous thrombosis 
prophylaxis.  Pain medications are per Dr. Behr.  She will have routine labs as 
well as an abdominal x-ray in the morning.  Will order aggressive pulmonary 
hygiene.  Plan for surgery at the discretion of Dr. Behr.  



#[id number]

[facility]

## 2019-12-24 NOTE — ED.PDOC
History of Present Illness





- General


Chief Complaint: GI Problem


Stated Complaint: abd pain


Time Seen by Provider: 19 14:42


Additional Information: 





Patient is a 54-year-old female who presents to the ED with chief complaint of 

abdominal pain.  Patient indicates she was just released from [facility] after a 4 day stay for partial small bowel obstruction treated 

conservatively with NG tube.  She went home last night and indicates that her 

abdomen began to distend, she is having decreased bowel movements and she had a 

single episode of emesis. Patient denies fever and chills.  Patient describes 

her pain as crampy and is 10 out of 10 in intensity. Before now patient had no 

previous history of bowel obstruction.  Past surgical history is significant for

. Patient indicates she has had very little by mouth intake since 

yesterday afternoon.





Review of Systems





- Review of Systems


Constitutional: States: weakness.  Denies: chills, fever


EENTM: States: no symptoms reported


Respiratory: Denies: cough, short of breath


Cardiology: States: no symptoms reported.  Denies: chest pain, palpitations


Gastrointestinal/Abdominal: States: abdominal pain, nausea, vomiting.  Denies: 

diarrhea


Genitourinary: States: no symptoms reported.  Denies: dysuria


Musculoskeletal: States: no symptoms reported


Skin: States: no symptoms reported


Neurological: States: no symptoms reported


Endocrine: States: no symptoms reported


Hematologic/Lymphatic: States: no symptoms reported


All other Systems: Reviewed and Negative





Past Medical History (General)





- Patient Medical History


Hx Seizures: No


Hx Stroke: No


Hx Dementia: No


Hx Asthma: No


Hx of COPD: No


Hx Cardiac Disorders: Yes - mitral valve prolapse


Hx Congestive Heart Failure: No


Hx Pacemaker: No


Hx Hypertension: Yes


Hx Thyroid Disease: No


Hx Diabetes: No


Hx Gastroesophageal Reflux: No


Hx Renal Disease: No


Hx Cancer: No


Hx of HIV: No


Hx Hepatitis C: No


Hx MRSA: No





- Vaccination History


Hx Tetanus, Diphtheria Vaccination: Yes


Hx Influenza Vaccination: Yes


Hx Pneumococcal Vaccination: No





- Social History


Hx Tobacco Use: No


Hx Alcohol Use: No


Hx Substance Use: No


Hx Substance Use Treatment: No


Hx Depression: No


Hx Physical Abuse: No


Hx Emotional Abuse: No


Hx Suspected Abuse: No





- Female History


Patient is a Female of Child Bearing Age (10 -59 yrs old): Yes


Patient Pregnant: No





Family Medical History





- Family History


  ** Mother


Living Status: Still Living


Hx Family Cancer: Yes - breast





Physical Exam





- Physical Exam


General Appearance: Alert, Obvious distress, Ill Appearing, Well Developed


Eyes, Ears, Nose, Throat Exam: normal ENT inspection, TMs normal, other - mucous

membranes are dry


Neck: non-tender, full range of motion


Respiratory: chest non-tender, lungs clear, normal breath sounds, no respiratory

distress, no accessory muscle use


Cardiovascular/Chest: regular rate, rhythm, no edema


Gastrointestinal/Abdominal: soft, no organomegaly, distended, guarding, 

tenderness


Back Exam: normal inspection, no CVA tenderness


Extremity: normal range of motion, non-tender, normal inspection, no pedal edema


Neurologic: CNs II-XII nml as tested, no motor/sensory deficits, alert, normal 

mood/affect


Skin Exam: normal color





Progress





- Progress


Progress: 





19 15:19


Differential diagnosis includes but is not limited to bowel obstruction, 

gastroenteritis, colitis, IBS





19 16:34


Patient's CT again shows high-grade small bowel obstruction.  Patient's labs are

unremarkable including her WBC and renal function.  I discussed case with Dr. Behr who requests that we replace an NG tube, 18 Icelandic, and admitted under the 

hospitalist care.  I discussed case with Dr. Washburn who accepts patient for 

admission.  Patient's vital signs are stable at this time and she is feeling 

slightly better status post IV analgesics.





- Results/Orders


Results/Orders: 





                                        





19 14:42


IV:Start .ONCE 





19 14:43


Hold Metformin x 48Hrs [id number] 





19 16:30


NG [Tube(s):Nasogastric,Insertion] PRN 








                               Laboratory Results











WBC  6.5 K/mm3 (4.8-10.8)   19  14:53    


 


RBC  5.03 M/mm3 (4.20-5.40)   19  14:53    


 


Hgb  15.8 gm/dL (12.0-16.0)   19  14:53    


 


Hct  46.2 % (36.0-47.0)   19  14:53    


 


MCV  91.9 fl (81.0-99.0)   19  14:53    


 


MCH  31.3 pg (27.0-31.0)  H  19  14:53    


 


MCHC  34.1 g/dL (33.0-37.0)   19  14:53    


 


RDW  12.6 % (11.5-14.5)   19  14:53    


 


Plt Count  286 K/mm3 (130-400)   19  14:53    


 


MPV  8.3 fl (7.40-10.4)   19  14:53    


 


Absolute Neuts (auto)  5.00 K/uL (1.8-6.8)   19  14:53    


 


Absolute Lymphs (auto)  0.90 K/uL (1.0-3.4)  L  19  14:53    


 


Absolute Monos (auto)  0.60 K/uL (0.2-0.8)   19  14:53    


 


Absolute Eos (auto)  0.00 K/uL (0.0-0.4)   19  14:53    


 


Absolute Basos (auto)  0.00 K/uL (0.0-0.1)   19  14:53    


 


Neutrophils %  77.4 % (42.0-78.0)   19  14:53    


 


Lymphocytes %  13.1 % (20.0-50.0)  L  19  14:53    


 


Monocytes %  8.6 % (2.0-9.0)   19  14:53    


 


Eosinophils %  0.7 % (1.0-5.0)  L  19  14:53    


 


Basophils %  0.2 % (0.0-2.0)   19  14:53    


 


Sodium  134 mmol/L (135-145)  L  19  14:53    


 


Potassium  4.6 mmol/L (3.6-5.0)   19  14:53    


 


Chloride  94 mmol/L (101-111)  L  19  14:53    


 


Carbon Dioxide  27 mmol/L (21-31)   19  14:53    


 


Anion Gap  17.6  (12-18)   19  14:53    


 


BUN  8 mg/dL (7-18)   19  14:53    


 


Creatinine  0.79 mg/dL (0.6-1.3)   19  14:53    


 


BUN/Creatinine Ratio  10.1  (10-20)   19  14:53    


 


Random Glucose  96 mg/dL ()   19  14:53    


 


Serum Osmolality  266.4 mOsm/L (275-295)  L  19  14:53    


 


Lactic Acid  1.5 mmol/L (0.5-2.2)   19  15:05    


 


Calcium  10.3 mg/dL (8.4-10.2)  H  19  14:53    


 


Total Bilirubin  0.8 mg/dL (0.2-1.0)   19  14:53    


 


AST  45 IU/L (10-42)  H  19  14:53    


 


ALT  33 IU/L (10-60)   19  14:53    


 


Alkaline Phosphatase  57 IU/L ()   19  14:53    


 


Serum Total Protein  7.6 gm/dL (6.4-8.2)   19  14:53    


 


Albumin  4.7 g/dl (3.2-5.5)   19  14:53    


 


Globulin  2.9 gm/dL (2.3-3.5)   19  14:53    


 


Albumin/Globulin Ratio  1.6  (1.1-1.9)   19  14:53    


 


Lipase  51 U/L (22-51)   19  14:53    


 


Urine Color  Yellow  (Yellow)   19  15:30    


 


Urine Appearance  Clear  (Clear)   19  15:30    


 


Urine pH  6.5  (4.5-7.8)   19  15:30    


 


Ur Specific Gravity  <= 1.005  (1.005-1.030)   19  15:30    


 


Urine Protein  Negative mg/dL  19  15:30    


 


Urine Glucose (UA)  Negative mg/dL (Negative)   19  15:30    


 


Urine Ketones  15 mg/dL (NEGATIVE)  H  19  15:30    


 


Urine Blood  Trace-intact  (Negative)  H  19  15:30    


 


Urine Nitrite  Negative   19  15:30    


 


Urine Bilirubin  Negative  (NEGATIVE)   19  15:30    


 


Urine Urobilinogen  0.2 mg/dL (0.2-1.0)   19  15:30    


 


Ur Leukocyte Esterase  Negative  (Negative)   19  15:30    


 


Urine RBC  0 /hpf  19  15:30    


 


Urine WBC  0-1 /hpf  19  15:30    


 


Ur Epithelial Cells  0-1 /hpf  19  15:30    


 


Urine Bacteria  0   19  15:30    














Departure





- Departure


Clinical Impression: 


 Small bowel obstruction





Time of Disposition: 16:37


Disposition: Admit Patient


Condition: Fair


Home Medications: 


Ambulatory Orders





ALPRAZolam [Xanax] 0.5 mg PO TID 14 


Calcium 2,000 mg PO DAILY 14 


Enalapril Maleate [Vasotec] 10 mg PO DAILY 14 


Vitamin D 1 each PO DAILY 14 


Linaclotide [Linzess] 145 mcg PO DAILY PRN 16 











Decision To Admit





- Decistion To Admit


Decision to Admit Reason: Admit from ER


Decision to Admit Date: 19


Decision to Admit Time: 16:38

## 2019-12-24 NOTE — CT
Study: CT abdomen and pelvis. 



Indication: SBO



Technique: Venous  phase CT imaging of the abdomen and pelvis

obtained after intravenous administration of contrast.  This exam

was performed according to our departmental dose-optimization

program, which includes automated exposure control, adjustment of

the mA and/or kV according to patient size and/or use of

iterative reconstruction technique.



Comparison: December 19, 2019.



Findings:



Lower chest, liver, gallbladder, pancreas, spleen, adrenal

glands, kidneys, bladder unremarkable. Uterus and ovaries are

surgically absent or small.



The colon is collapsed. Persistent marked dilatation of the

stomach and small bowel with air-fluid levels. Small bowel

measures up to 3.7 cm in diameter. Findings are consistent with a

resolving high grade small bowel obstruction. A transition site

is likely present at the distal ileum but difficult to confirm.

No pneumatosis. No free fluid. No free air. No pathologically

enlarged lymphadenopathy. Bilateral L5 spondylolysis with grade 1

spondylolisthesis.



Impression:



Persistent high-grade small bowel obstruction.



Electronically signed by:  Ramin Bella MD  12/24/2019 3:41 PM

[facility] Workstation: [id number]

## 2019-12-25 RX ADMIN — MORPHINE SULFATE PRN MG: 10 INJECTION INTRAVENOUS at 08:16

## 2019-12-25 RX ADMIN — PROMETHAZINE HYDROCHLORIDE PRN MLS/HR: 25 INJECTION INTRAMUSCULAR; INTRAVENOUS at 02:23

## 2019-12-25 RX ADMIN — PROMETHAZINE HYDROCHLORIDE PRN MLS/HR: 25 INJECTION INTRAMUSCULAR; INTRAVENOUS at 09:07

## 2019-12-25 RX ADMIN — MORPHINE SULFATE PRN MG: 10 INJECTION INTRAVENOUS at 21:06

## 2019-12-25 RX ADMIN — PANTOPRAZOLE SODIUM SCH MG: 40 INJECTION, POWDER, FOR SOLUTION INTRAVENOUS at 20:55

## 2019-12-25 RX ADMIN — POTASSIUM CHLORIDE, DEXTROSE MONOHYDRATE AND SODIUM CHLORIDE PRN MLS/HR: 150; 5; 450 INJECTION, SOLUTION INTRAVENOUS at 11:28

## 2019-12-25 RX ADMIN — PROMETHAZINE HYDROCHLORIDE PRN MLS/HR: 25 INJECTION INTRAMUSCULAR; INTRAVENOUS at 21:05

## 2019-12-25 RX ADMIN — MORPHINE SULFATE PRN MG: 10 INJECTION INTRAVENOUS at 01:52

## 2019-12-25 RX ADMIN — POTASSIUM CHLORIDE, DEXTROSE MONOHYDRATE AND SODIUM CHLORIDE PRN MLS/HR: 150; 5; 450 INJECTION, SOLUTION INTRAVENOUS at 20:15

## 2019-12-25 RX ADMIN — MORPHINE SULFATE PRN MG: 10 INJECTION INTRAVENOUS at 15:30

## 2019-12-25 RX ADMIN — POTASSIUM CHLORIDE, DEXTROSE MONOHYDRATE AND SODIUM CHLORIDE PRN MLS/HR: 150; 5; 450 INJECTION, SOLUTION INTRAVENOUS at 02:22

## 2019-12-25 RX ADMIN — ENOXAPARIN SODIUM SCH MG: 40 INJECTION, SOLUTION INTRAVENOUS; SUBCUTANEOUS at 21:04

## 2019-12-25 NOTE — RAD
:1965.

Sex:Female.



TECHNIQUE: Supine and upright views of the abdomen. Comparing CT

scan 2019



CLINICAL HISTORY:fu bowel obstruction. 



FINDINGS:

Nasogastric tube is at the EG junction and can be advanced

several centimeters to be in the stomach.

Dilated small bowel loops with fluid levels ranging up to 4.5 cm

consistent with small bowel obstruction.

Moderate gastric dilatation with a fluid level.

There is reduced distal colonic gas and no gas is seen in the

rectum.

Incidental right subphrenic colonic interposition is noted. No

other specific evidence for free air.

Contrast excretion in the urinary bladder from the preceding CT

scan noted.

Lung bases are clear.



IMPRESSION:

1. NG tube at the EG junction.

2. Small bowel obstruction.



Electronically signed by:  Duane Vazquez MD  2019 9:02 AM

[facility] Workstation: [id number]

## 2019-12-26 PROCEDURE — 0DN84ZZ RELEASE SMALL INTESTINE, PERCUTANEOUS ENDOSCOPIC APPROACH: ICD-10-PCS | Performed by: SURGERY

## 2019-12-26 PROCEDURE — 0DN80ZZ RELEASE SMALL INTESTINE, OPEN APPROACH: ICD-10-PCS | Performed by: SURGERY

## 2019-12-26 RX ADMIN — POTASSIUM CHLORIDE, DEXTROSE MONOHYDRATE AND SODIUM CHLORIDE PRN MLS/HR: 150; 5; 450 INJECTION, SOLUTION INTRAVENOUS at 17:37

## 2019-12-26 RX ADMIN — HYDROMORPHONE HYDROCHLORIDE ONE MG: 2 INJECTION, SOLUTION INTRAMUSCULAR; INTRAVENOUS; SUBCUTANEOUS at 15:40

## 2019-12-26 RX ADMIN — LABETALOL HYDROCHLORIDE ONE MG: 5 INJECTION INTRAVENOUS at 15:53

## 2019-12-26 RX ADMIN — HYDROMORPHONE HYDROCHLORIDE ONE MG: 2 INJECTION, SOLUTION INTRAMUSCULAR; INTRAVENOUS; SUBCUTANEOUS at 15:45

## 2019-12-26 RX ADMIN — MORPHINE SULFATE PRN MG: 10 INJECTION INTRAVENOUS at 19:44

## 2019-12-26 RX ADMIN — POTASSIUM CHLORIDE, DEXTROSE MONOHYDRATE AND SODIUM CHLORIDE PRN MLS/HR: 150; 5; 450 INJECTION, SOLUTION INTRAVENOUS at 05:27

## 2019-12-26 RX ADMIN — MORPHINE SULFATE PRN MG: 10 INJECTION INTRAVENOUS at 11:22

## 2019-12-26 RX ADMIN — HYDROMORPHONE HYDROCHLORIDE ONE MG: 2 INJECTION, SOLUTION INTRAMUSCULAR; INTRAVENOUS; SUBCUTANEOUS at 15:57

## 2019-12-26 RX ADMIN — HYDROMORPHONE HYDROCHLORIDE ONE MG: 2 INJECTION, SOLUTION INTRAMUSCULAR; INTRAVENOUS; SUBCUTANEOUS at 16:08

## 2019-12-26 RX ADMIN — MORPHINE SULFATE PRN MG: 10 INJECTION INTRAVENOUS at 07:58

## 2019-12-26 RX ADMIN — PANTOPRAZOLE SODIUM SCH MG: 40 INJECTION, POWDER, FOR SOLUTION INTRAVENOUS at 20:49

## 2019-12-26 RX ADMIN — PROMETHAZINE HYDROCHLORIDE PRN MLS/HR: 25 INJECTION INTRAMUSCULAR; INTRAVENOUS at 21:53

## 2019-12-26 RX ADMIN — ENOXAPARIN SODIUM SCH MG: 40 INJECTION, SOLUTION INTRAVENOUS; SUBCUTANEOUS at 20:48

## 2019-12-26 RX ADMIN — LABETALOL HYDROCHLORIDE ONE MG: 5 INJECTION INTRAVENOUS at 15:32

## 2019-12-26 RX ADMIN — MORPHINE SULFATE PRN MG: 10 INJECTION INTRAVENOUS at 03:40

## 2019-12-26 RX ADMIN — LABETALOL HYDROCHLORIDE ONE MG: 5 INJECTION INTRAVENOUS at 16:01

## 2019-12-27 RX ADMIN — MORPHINE SULFATE PRN MG: 10 INJECTION INTRAVENOUS at 07:34

## 2019-12-27 RX ADMIN — PROMETHAZINE HYDROCHLORIDE PRN MLS/HR: 25 INJECTION INTRAMUSCULAR; INTRAVENOUS at 21:27

## 2019-12-27 RX ADMIN — POTASSIUM CHLORIDE, DEXTROSE MONOHYDRATE AND SODIUM CHLORIDE PRN MLS/HR: 150; 5; 450 INJECTION, SOLUTION INTRAVENOUS at 18:34

## 2019-12-27 RX ADMIN — MORPHINE SULFATE PRN MG: 10 INJECTION INTRAVENOUS at 13:41

## 2019-12-27 RX ADMIN — POTASSIUM CHLORIDE, DEXTROSE MONOHYDRATE AND SODIUM CHLORIDE PRN MLS/HR: 150; 5; 450 INJECTION, SOLUTION INTRAVENOUS at 01:41

## 2019-12-27 RX ADMIN — MORPHINE SULFATE PRN MG: 10 INJECTION INTRAVENOUS at 21:57

## 2019-12-27 RX ADMIN — MORPHINE SULFATE PRN MG: 10 INJECTION INTRAVENOUS at 10:54

## 2019-12-27 RX ADMIN — POTASSIUM CHLORIDE, DEXTROSE MONOHYDRATE AND SODIUM CHLORIDE PRN MLS/HR: 150; 5; 450 INJECTION, SOLUTION INTRAVENOUS at 09:30

## 2019-12-27 RX ADMIN — PANTOPRAZOLE SODIUM SCH MG: 40 INJECTION, POWDER, FOR SOLUTION INTRAVENOUS at 20:39

## 2019-12-27 RX ADMIN — ENOXAPARIN SODIUM SCH MG: 40 INJECTION, SOLUTION INTRAVENOUS; SUBCUTANEOUS at 20:39

## 2019-12-27 RX ADMIN — MORPHINE SULFATE PRN MG: 10 INJECTION INTRAVENOUS at 03:10

## 2019-12-27 RX ADMIN — PROMETHAZINE HYDROCHLORIDE PRN MLS/HR: 25 INJECTION INTRAMUSCULAR; INTRAVENOUS at 09:30

## 2019-12-27 NOTE — OP
DATE OF PROCEDURE:  12/26/19



PREOPERATIVE DIAGNOSIS: 

1.  High grade partial small bowel obstruction.



POSTOPERATIVE DIAGNOSIS: 

1.  High grade partial small bowel obstruction.



PROCEDURE: 

1.  Laparoscopy with lysis of adhesions followed by laparotomy and further lysis

     of adhesions.  



SURGEON:  Donald A. Behr, MD.



ASSISTANT:  Real Padron MD.



ANESTHESIA:  General endotracheal anesthesia.



INDICATION:  The patient is a 54-year-old female who was hospitalized last week 
with what appeared to be a partial small bowel obstruction.  She was treated 
conservatively with nasogastric suction.  Her symptoms resolved and she began 
passing gas and had a large amount of gas in the colon.  She was started on a 
clear liquid diet, which she tolerated well.  Her nasogastric tube was removed 
and her diet was advanced.  She tolerated this and was sent home.  Within 24 
hours, she was back with abdominal distention, nausea, vomiting and crampy 
abdominal pain.  CT scan again revealed bilateral loops of small bowel with what
appeared to be a transition point in the right lower quadrant near the terminal 
ileum.  



FINDINGS:  There was a group of loops of small bowel adhesed to the right pelvic
sidewall and then with interloop adhesions causing a discrete narrowing of the 
bowel secondary to adhesions just proximal to the ileocecal junction.  There was
no peritoneal studding, signs of masses, her liver appeared to be normal.  No 
other obvious pathology was identified.  Approximately 40 mL of straw-colored 
ascites was aspirated at the beginning of the case.



PROCEDURE:  After the patient was brought to the Surgical Suite, general 
endotracheal anesthesia was obtained.  She was given IV Ancef.  The abdomen was 
prepped and draped in the usual sterile manner.  At this time, a surgical time-
out was taken.  At this time, a vertical incision was made below the umbilicus, 
first with local anesthesia and then with a sharp knife.  Dissection was carried
down through the skin and subcutaneous tissue to the midline fascia.  Traction 
sutures were placed on either side of the midline.  A small incision was made in
the midline fascia and the peritoneum was opened bluntly.  An Luis trocar was 
then introduced under direct vision into the abdominal cavity and fixed in place
with a 20 mL balloon.  CO2 was insufflated until the pressure of 12 cm of water 
was obtained.  The laparoscope was introduced.  The abdomen was inspected with 
the previously noted findings.  Since there initially was a loop of small bowel 
that was anteriorly adhesed to the right pelvic sidewall, we placed 5 mm ports 
in the left lower quadrant and suprapubic area and then using sharp dissection, 
this loop of bowel was dissected free.  However, upon dissecting this loop free,
we saw that there were many further areas of adhesion deeper in the pelvis, so 
at this point, we removed the ports and CO2 and a midline incision was made from
the suprapubic port site to the infraumbilical port site.  The skin was incised 
and dissection was carried down through the skin and subcutaneous tissue to the 
fascia using electrocautery.  The fascia was scored and the incision was opened 
for the length of the incision using electrocautery.  Retractors were placed.  
Initially, the suction was used to aspirate the ascites in the pelvis, so at 
this point, we began the dissection.  Eventually, several loose adhesions were 
lysed and then several loops of small bowel were densely adhesed to one another 
and to the terminal ileum.  These were lysed individually using sharp dissection
and electrocautery.  When this was done, the area of bowel had adhesed 
essentially to itself and this was opened up which allowed us to pass a large 
amount of gas and fluid from the distal small bowel into the colon.  There was 
no sign of leak and the bowel was all pink, so the remaining gas and fluid was 
milked back into the stomach where it was aspirated by Anesthesia.  At this 
point, hemostasis was noted to be adequate, so the abdomen was irrigated 
copiously with saline.  Hemostasis was noted to be adequate.  The small bowel 
was placed back in the abdomen.  The mesentery was placed over the bowel and 
then a sponge was placed over the mesentery and the fascia was closed with 
running 0 double strand PDS.  It was then tightened and tied in the pelvis.  The
knot was sutured down with a  3-0 Vicryl figure-of-eight suture.  When this was 
done, again, the subcutaneous tissue was irrigated with saline.  The skin edges 
were approximated with a skin stapler and this includes the left lower quadrant 
port site.  When this was done, sterile dressing was applied.  The patient 
tolerated the procedure well.  We will have the Fishman catheter removed in the 
Recovery Room.  We will leave the nasogastric tube intact.  Estimated blood loss
was less than 50 mL.  All sponge, needle and instrument counts were correct.



#[id number]

[facility]

## 2019-12-28 RX ADMIN — POTASSIUM CHLORIDE, DEXTROSE MONOHYDRATE AND SODIUM CHLORIDE PRN MLS/HR: 150; 5; 450 INJECTION, SOLUTION INTRAVENOUS at 10:23

## 2019-12-28 RX ADMIN — PANTOPRAZOLE SODIUM SCH MG: 40 INJECTION, POWDER, FOR SOLUTION INTRAVENOUS at 20:24

## 2019-12-28 RX ADMIN — PROMETHAZINE HYDROCHLORIDE PRN MLS/HR: 25 INJECTION INTRAMUSCULAR; INTRAVENOUS at 02:28

## 2019-12-28 RX ADMIN — MORPHINE SULFATE PRN MG: 10 INJECTION INTRAVENOUS at 02:02

## 2019-12-28 RX ADMIN — MORPHINE SULFATE PRN MG: 10 INJECTION INTRAVENOUS at 20:26

## 2019-12-28 RX ADMIN — PROMETHAZINE HYDROCHLORIDE PRN MLS/HR: 25 INJECTION INTRAMUSCULAR; INTRAVENOUS at 11:06

## 2019-12-28 RX ADMIN — ENOXAPARIN SODIUM SCH MG: 40 INJECTION, SOLUTION INTRAVENOUS; SUBCUTANEOUS at 20:26

## 2019-12-28 RX ADMIN — PROMETHAZINE HYDROCHLORIDE PRN MLS/HR: 25 INJECTION INTRAMUSCULAR; INTRAVENOUS at 21:03

## 2019-12-28 RX ADMIN — MORPHINE SULFATE PRN MG: 10 INJECTION INTRAVENOUS at 14:10

## 2019-12-29 RX ADMIN — POTASSIUM CHLORIDE, DEXTROSE MONOHYDRATE AND SODIUM CHLORIDE PRN MLS/HR: 150; 5; 450 INJECTION, SOLUTION INTRAVENOUS at 00:14

## 2019-12-29 RX ADMIN — MORPHINE SULFATE PRN MG: 10 INJECTION INTRAVENOUS at 16:44

## 2019-12-29 RX ADMIN — Medication SCH ML: at 20:42

## 2019-12-29 RX ADMIN — MORPHINE SULFATE PRN MG: 10 INJECTION INTRAVENOUS at 20:41

## 2019-12-29 RX ADMIN — POLYETHYLENE GLYCOL 3350 SCH GM: 17 POWDER, FOR SOLUTION ORAL at 13:41

## 2019-12-29 RX ADMIN — MORPHINE SULFATE PRN MG: 10 INJECTION INTRAVENOUS at 11:44

## 2019-12-29 RX ADMIN — ENOXAPARIN SODIUM SCH MG: 40 INJECTION, SOLUTION INTRAVENOUS; SUBCUTANEOUS at 20:41

## 2019-12-30 VITALS — OXYGEN SATURATION: 98 % | SYSTOLIC BLOOD PRESSURE: 119 MMHG | DIASTOLIC BLOOD PRESSURE: 72 MMHG | TEMPERATURE: 98.8 F

## 2019-12-30 RX ADMIN — Medication SCH ML: at 08:40

## 2019-12-30 RX ADMIN — POLYETHYLENE GLYCOL 3350 SCH GM: 17 POWDER, FOR SOLUTION ORAL at 07:56

## 2019-12-31 NOTE — DS
SUPERVISING PHYSICIAN:  Abiodun Lugo MD



ADMISSION DIAGNOSIS:

1.   Small bowel obstruction.

2.   Sudden inflammatory response syndrome secondary to possible enteritis.  She

      has admitting WBCs of 15,500 and elevated lactic acid.

3.   Dehydration secondary to nausea and vomiting as well as #1.

4.   Hypertension.

5.   Chronic cervical neck pain.

6.   Hyperlipidemia.

7.   History of breast cancer with left mastectomy.



DISCHARGE DIAGNOSIS: 

1.   Small bowel obstruction, resolved with the patient tolerating clear 
liquids.  

2.   Inflammatory response syndrome secondary to #1, resolved with fluids.

3.   Dehydration, resolved and stable with IV fluids.

4.   Hypertension.

5.   Chronic cervical neck pain.

6.   Hyperlipidemia.

7.   History of breast cancer with left mastectomy.



REASON FOR HOSPITALIZATION:  This is a 54 year-old female patient who came to 
the Emergency Room secondary to nausea and vomiting with abdominal pain for 24 
to 36 hours.  She was actually seen in the Emergency Room here on the 17th with 
some abdominal pain but was not having any vomiting at that time.  She had an 
extensive workup including labs and CT that were unremarkable.  She presented 
back to the Emergency Room today secondary to increasing abdominal pain as well 
as nausea and vomiting.  She does not have any history of bowel obstruction.  
She does have a history of significant constipation.  She has seen a 
gastroenterologist in the past, but showed no definitive pathology.  She has a 
past history of breast cancer with a left mastectomy with no recurring issues.  
Initially in the Emergency Room her vital signs were temperature 96.6, heart 
rate 83, blood pressure 154/106, respiratory rate 20, O2 saturation 99%.  Lab 
was done that showed a WBC of 15,500 with hemoglobin 17.9, hematocrit 53.4.  D-
dimer was negative at 0.19.  Sodium was low at 125 with potassium 3.8, chloride 
81, BUN 42, creatinine 1.47.  Blood glucose was 137 with serum osmolality of 
264.1, lactic acid 3.9, calcium 10.3.  Total bilirubin was 1.1 with AST of 70, 
creatinine kinase 673, CK-MB 24.9.  Troponin was elevated at 0.2 but she had no 
chest pain or EKG changes.  Urinalysis was unremarkable.  Blood cultures were 
drawn.  Influenza A and B via PCR were both negative.  Abdominal x-ray showed 
nondistended loops of small bowel with air-fluid levels, may be related to 
enteritis.  Developing bowel obstruction not completely excluded.



MEDICAL CONSULTATION:  Dr. Behr, general surgeon.  Please see his notes for 
details.  



LABORATORY:  White count on admission was 15,500, hemoglobin 17.9, hematocrit 
33.4 with a left shift.  After treatment and prior to discharge and with fluids,
her white count was down to 6.5, hemoglobin 12.5, hematocrit 37.3, platelet 
count 200 and differential had resolved.  Coagulation studies showed a normal D-
dimer.  Chemistries on admission showed sodium 125, anion gap 22.8, BUN 42, 
creatinine 1.47.  Lactic acid was 3.9 and normalized after fluids to 1.8.  
Calcium 10.3.  Bilirubin initially 1.1, AST 70, creatinine 6.73, troponin 0.20. 
Lipase elevated at 53, amylase normal at 70.  TSH normal at 4.97.  After 
treatment and prior to discharge, electrolytes had normalized.  BUN was down to 
0.72.  Liver functions had normalized.  Urinary symptoms just showed a trace of 
lysed blood, otherwise within normal limits.  



MICROBIOLOGY:  Blood cultures showed no growth at 5 days.  Influenza A and B by 
PCR were negative.  



RADIOLOGY:  She had an abdominal x-ray and abdominopelvic CT with abdominopelvic
CT without contrast showing an abnormal small bowel pattern consistent with 
small bowel obstruction.  Small, nonobstructing renal calculi observed.  
Uncomplicated diverticulosis of the colon.  Bilateral L5 spondylosis with grade 
1 spondylolisthesis.  She had multiple abdominal x-rays with the last one being 
on 12/22/19 and per radiologic interpretation showed mid small bowel ileus 
versus obstruction without significant change.



HOSPITAL COURSE:  Ms. Lorenzo was admitted for small bowel obstruction with 
surgical consultation.  She was placed on NG tube.  She was put on NPO status, 
given pain medicine and fluids.  Her labs had normalized.  She was gradually 
able to advance to a clear liquid diet.  She was no longer having significant 
pain and was having small bowel movements.  It was felt she had clinically 
resolved well enough to continue with outpatient management.  Therefore, she was
discharged home per Dr. Behr's request. 



PLAN:  She was to have followup with gastroenterologist and Dr. Persaud 
within one to two weeks.  She was to hold her Bentyl until she was seen in 
followup by the gastroenterologist.  She was to take her Linzess as directed 
daily and advance her diet slowly as tolerated.  She was to resume all other 
medications as instructed.  She was encouraged to push fluids to prevent 
dehydration.  She was told to return to the hospital should she have any need 
for reevaluation, symptoms or failing to improve or symptoms worsen.  No 
prescriptions were provided on discharge.  All other medications were resumed as
instructed.



CONDITION ON DISCHARGE:  Stable and improved.



DISPOSITION:  The patient is discharged to care of family members.  



#[id number]

[facility]

## 2020-01-05 NOTE — DS
SUPERVISING PHYSICIAN:    Abiodun Lugo MD



ADMISSION DIAGNOSES:

1.   Small bowel obstruction.

2.   Severe dehydration secondary to nausea and vomiting as well as #1.

3.   Hypertension.

4.   Chronic cervical neck pain.

5.   Hyperlipidemia.

6.   History of breast cancer with left mastectomy.



DISCHARGE DIAGNOSES: 

1.   Small bowel obstruction status post exploratory laparotomy for lysis of 
adhesions,

      performed by Dr. Donald Behr, general surgeon, postoperative day #4.

2.   Dehydration secondary to #1, resolved.

3.   Hypertension, started back on Vasotec and stable.

4.   Chronic cervical neck pain.

5.   Hyperlipidemia.

6.   History of breast cancer with left mastectomy.



REASON FOR HOSPITALIZATION:    This is a 54 year-old female patient who came to 
the Emergency Room complaining of abdominal pain.  She had just been released 
from the hospital after a 4-day stay for a partial small bowel obstruction and 
treated conservatively with an nasogastric tube.  After going home she did 
fairly well, but the night prior to her admission to the hospital she had some 
bloating in her abdomen, and she did not have any bowel movements.  She also had
1 episode of vomiting.  There were no fever or chills.  She had lots of 
abdominal cramps with abdominal pain.  Vital signs in the Emergency Room showed 
temperature 98.4, heart rate 81, blood pressure 176/114, respiratory rate 18, O2
sat 98% on room air.  Lab studies were done and her CBC was unremarkable.  
Sodium was slightly low at 134 with chloride 94, lactic acid 1.5, serum 
osmolality 266.4, calcium 10.3 and AST was 45.  The remainder of her liver 
enzymes were within normal limits.  Urine did have 15 urine ketones and a trace 
of intact urine blood.  An nasogastric tube was placed and she was given some IV
fluids as well as some Phenergan.  Her abdominal CT showed persistent high grade
small bowel obstruction.  Dr. Behr was consulted.  He agreed that she should be 
put in the hospital and his orders were written and plans to take to surgery.  
The patient was admitted in stable condition.



CONSULTATION:   Donald Behr, MD



PROCEDURE:  Laparoscopy with lyses of adhesions followed by a laparotomy and 
further lysis of adhesions performed by Dr. Behr.  Please see Dr. Behr's report 
for details.  



LABORATORY:    Admission hemoglobin and hematocrit were 15.8 and 46.2. at 
discharge were 11.4 and 34.0.  White count was 3,900 at discharge, differential 
didn't show a shift.  Her chemistries were showing to be stable at discharge.  
Electrolytes were normal.  Creatinine was at 0.6.  Liver functions were within 
normal limits except for a slightly elevated, AST at 45.  Urinalysis showed 15 
of ketones, trace intact blood.  



MICROBIOLOGY:  No specimens.



RADIOLOGY:   She had an abdominal/pelvic CT  prior to admission which showed 
persistent high grade small bowel obstruction,  Preoperatively she had an 
abdominal x-ray which showed NG tube in place with small bowel obstruction.  
Postoperatively no additional studies were done.



HOSPITAL COURSE:   Ms. Lorenzo was admitted from the Emergency Room on 12/24/19 
for a small bowel obstruction.  She was taken to surgery by Dr. Behr on 12/26 
and it was found she had a laparoscopy with lysis of adhesions followed by 
laparotomy and further lysis of adhesions.  The patient did well 
intraoperatively and had no complications postoperatively.  Her diet was 
advanced.  She was tolerating a diet.  She was ambulating, she was having bowel 
movements.  She was showing to be clinically stable enough to continue with 
outpatient management.  Her vital signs on discharge showed she was afebrile at 
98.8 with a pulse of 68, blood pressure 119/73, respirations 16, oxygen 
saturation 98% on room air.



PLAN:  Ms. Lorenzo was discharged on 12/30/19 with instructions  to followup with
Dr. Behr on 01/08/20.  She was to resume medications as instructed.  She was 
instructed on how splint her incision site  help control with pain.  She was 
told to return to the Emergency Room if she had any worsening symptoms.



Discharge Diet:  Advance diet as tolerated.  She was encouraged to chew her food
fully before swallowing.



Activities:  Increase activities as tolerated but no lifting or pulling until 
she is cleared by Dr. Behr.



Medications on Discharge:

Miralax 17 grams daily, #20, no refills, to be continued by Dr. Behr.



Additional pain management was provided by Dr. Behr per his prescription.



All other medications prior to hospitalization were continued.

Condition on discharge:  Stable and improved.

Disposition:  The patient was discharged to home.



#[id number]

[facility]

## 2020-11-30 ENCOUNTER — HOSPITAL ENCOUNTER (OUTPATIENT)
Dept: HOSPITAL 39 - GMAE | Age: 55
End: 2020-11-30
Attending: FAMILY MEDICINE
Payer: COMMERCIAL

## 2020-11-30 DIAGNOSIS — Z00.00: Primary | ICD-10-CM

## 2021-02-11 ENCOUNTER — HOSPITAL ENCOUNTER (OUTPATIENT)
Dept: HOSPITAL 39 - GMAE | Age: 56
End: 2021-02-11
Attending: FAMILY MEDICINE
Payer: COMMERCIAL

## 2021-02-11 DIAGNOSIS — U07.1: Primary | ICD-10-CM

## 2021-02-11 DIAGNOSIS — R06.02: ICD-10-CM

## 2024-05-21 NOTE — PN
DATE:  12/25/19



SUPERVISING PHYSICIAN:  Abiodun Lugo M.D.



SUBJECTIVE:  The patient is sitting up in her bed.  She continues to have some 
cramping in the abdomen but it had improved.  I explained to her that the 
nasogastric tube was not in far enough and that we would have to advance it and 
she was okay with that.  She understands that she will most likely go to surgery
tomorrow per Dr. Behr's instructions.  She has had no nausea or vomiting.  She 
has had quite a bit put out of her nasogastric tube.



OBJECTIVE:

VITAL SIGNS:  Temperature 97.1, heart rate 76, blood pressure 124/80, 
respiratory rate 16, O2 saturation 95% on room air.

GENERAL:  This is a 54 year-old female patient who is sitting up in her hospital
bed.  She looks to be moderately ill.

HEENT:  Normocephalic and atraumatic.  Pupils are equal and reactive.  
Oropharynx is clear.  Oral mucous membranes are very dry.

NECK:  Supple without mass.

RESPIRATORY:  Essentially clear to auscultation bilaterally.

CHEST:  There is equal rise and fall of the chest with inspiration and 
expiration.

CARDIAC:  Regular rate and rhythm.

GASTROINTESTINAL:  Abdomen is soft.  It is mildly distended.  It is diffusely 
tender in all four quadrants.  Most of her pain is in the epigastric and left 
upper quadrant.  There is no rebound tenderness but there is some mild guarding.
 Bowel sounds are positive but diminished.

EXTREMITIES:  No clubbing, cyanosis or edema.

SKIN:  Warm and dry.

NEUROLOGIC:  She is awake, alert and oriented times three.  Cranial nerves II-
XII are grossly intact as tested.



LABORATORY:  Sodium 134.  The remainder of her electrolytes are within normal 
limits.  CBC is unremarkable.  



Initial abdominal x-ray shows:

1.   Nasogastric tube at the EG junction.

2.   Small bowel obstruction.



All other labs and films have been reviewed via the EMR.



ASSESSMENT: 

1.   Small bowel obstruction.

2.   Dehydration secondary to nausea and vomiting as well as the small bowel 

      obstruction.

3.   Hypertension.

4.   Chronic cervical neck pain.

5.   Hyperlipidemia.

6.   History of breast cancer with left mastectomy.



PLAN:  We will continue present supportive care.  Surgical issues will be per 
Dr. Behr, general surgeon.  At this point I believe I may give her an extra 500 
mL of saline as she is still quite dehydrated.  We have advanced her nasogastric
tube about 12 cm.  We immediately got about 100 mL gastric contents returned.  I
have also given her some Ativan for anxiety.  Her labs and other orders will be 
as per Dr. Behr's instructions.  Will continue to monitor closely and follow as 
needed.



#[id number]

MTDD
DATE:  12/28/19



SUPERVISING PHYSICIAN:  Abiodun Lugo M.D.



SUBJECTIVE:  The patient is doing well today.  She has been advanced to a 
regular diet.  She is ambulating.  She says her pain is controlled.  She has not
had any nausea since the nasogastric tube has been removed.  



OBJECTIVE:

VITAL SIGNS:  Stable. Temperature 98.3, pulse 82, blood pressure 145/89, 
respirations 16, satting 99% on room air.

GENERAL:  The patient is resting comfortably just having ambulated.  She is in 
no distress.

CHEST:  Clear to auscultation.

HEART:  Regular rate and rhythm.

ABDOMEN:  Soft.  Positive bowel sounds with tenderness just to the incision site
with no signs of drainage or infectious process.

NEUROLOGIC:  She is alert and oriented times three. 



LABORATORY:  She did have labs today.  Hemoglobin and hematocrit were showing to
be stable at 11.4 and 34.0, platelet count 249,000 with white count 3,900 with 
no left shift.  Chemistries show normal electrolytes, creatinine 0.6.



ASSESSMENT: 

1.   Small bowel obstruction status post exploratory laparotomy for lysis of 
adhesions,

      performed by Dr. Donald Behr, general surgeon, postoperative day #3.

2.   Dehydration secondary to #1, resolved.

3.   Hypertension, started back on Vasotec and stable.

4.   Chronic cervical neck pain.

5.   Hyperlipidemia.

6.   History of breast cancer with left mastectomy.



PLAN:  I have anticipated discharging tomorrow.  After talking with Dr. Behr, 
she will be advanced to a diet today and as long as she has no complications 
overnight, she will go home in the morning to followup with Dr. Behr.  Until we 
can transition her to outpatient management will continue to monitor and treat 
as needed.



#[id number]

[facility]
DATE:  12/28/19



SUPERVISING PHYSICIAN:  Abiodun Lugo M.D.



SUBJECTIVE:  The patient is doing well.  She has been ambulating.  She tolerated
clear liquids yesterday and this morning.  Nasogastric tube has been clamped 
with minimal drainage.  She has not had any chest pains.  No diarrhea.  No 
nausea or vomiting.



OBJECTIVE:

VITAL SIGNS:  Temperature 98.5, pulse 68, blood pressure 131/84, respirations 
16, satting 97% on room air.  I's and O's show drainage from nasogastric tube to
be about 100 in the last 24 hours apparently.  Weight is 42.6 kg.

GENERAL:  The patient is resting comfortably. 

HEENT:  Nasogastric tube remains in place without any complications.

CHEST:  Clear to auscultation.

HEART:  Regular rate and rhythm.

ABDOMEN:  Soft, just tender to palpation from surgical incisions.  The patient 
continues to splint as directed.

NEUROLOGIC:  She is over times three.



LABORATORY:  No laboratory today.



RADIOLOGY:  No radiographic studies.



ASSESSMENT: 

1.   Small bowel obstruction status post exploratory laparotomy for lysis of 
adhesions,

      performed by Dr. Donald Behr, general surgeon, postoperative day #2.

2.   Dehydration secondary to #1, improving. .

3.   Hypertension, stable.

4.   Chronic cervical neck pain.

5.   Hyperlipidemia.

6.   History of breast cancer with left mastectomy.



PLAN:  Again, will continue to follow Dr. Behr's recommendations 
postoperatively.  Anticipate that she will probably be able to get her 
nasogastric tube out today and hopefully advance her diet within the next 24 to 
48 hours if not today.  Until Dr. Behr feels the patient is safe to discharge 
will continue to monitor and treat as needed.



#[id number]

[facility]
SUPERVISING PHYSICIAN:  Abiodun Lugo M.D.



DATE:  12/26/19



SUBJECTIVE:  The patient is being seen postoperatively after her abdominal 
surgery today performed by Dr. Behr, general surgeon.  She had an exploratory 
laparotomy and lysis of adhesions secondary to a small bowel obstruction.  She 
had no intraoperative complications.  She has no complaints postoperatively, no 
complaints of nausea or vomiting.  



OBJECTIVE:

VITAL SIGNS:  Temperature 98.2.  Heart rate 76.  Blood pressure 129/75.  
Respiratory rate 16.  O2 saturation 92% on room air.  

RESPIRATORY:  Essentially clear to auscultation bilaterally.

CARDIAC:  Regular rate and rhythm.

GASTROINTESTINAL:  She has a midline abdominal dressing that is dry and intact. 
She is diffusely tender.  Bowel sounds are hypoactive.  

NEUROLOGIC:  She is awake, alert and oriented times three.  



LABORATORY:  There are no labs or films to report at this time.



ASSESSMENT: 

1.   Small bowel obstruction status post exploratory laparotomy for lysis of 
adhesions,

      performed by Dr. Donald Behr, general surgeon, postoperative day 0.

2.   Dehydration secondary to nausea and vomiting, improved.

3.   Hypertension.

4.   Chronic cervical neck pain.

5.   Hyperlipidemia.

6.   History of breast cancer with left mastectomy.



PLAN:  We will continue present supportive care.  Operative issues will be per 
Dr. Behr, general surgeon.  I have encouraged good pulmonary hygiene and 
encouraged her to get up to the chair and follow Dr. Behr's instructions.  We 
discussed her postoperative care.  We will continue to monitor closely and 
follow as needed.



#[id number]

[facility]
SUPERVISING PHYSICIAN:  Abiodun Lugo M.D.



DATE:  12/27/19



SUBJECTIVE:  The patient is postoperative day #1.  She has been up ambulating. 
She has had a little bit of nausea after the NG tube was initially clamped.  She
has been splinting with a pillow, notes her pain is controlled.  She has not had
any chest pain or diarrhea.



OBJECTIVE:

VITAL SIGNS:  Temperature 98.5.  Heart rate 78.  Blood pressure 147/84.  
Respiratory rate 16.  O2 saturation 96% on room air.  I&Os show a negative 
balance of 1616.  Weight 41.5 kg.  

GENERAL:  The patient is resting in bed.  NG tube is in place.  He appears to be
in no acute distress.

HEENT:  Nasogastric tube remains in place without any complications.

CHEST:  Lung sounds are clear to auscultation.

CARDIAC:  Regular rate and rhythm.

ABDOMEN:  Bowel sounds were hypoactive but present.  She remains diffusely 
tender postoperative as expected.  Abdominal dressing remains in place, clear 
and dry.

NEUROLOGIC:  She is alert and oriented x 3.



LABORATORY:  Hemoglobin 11.8, hematocrit 35.0, differential shows to be without 
a left shift.  White count within normal limits.  Chemistries show normal 
electrolytes.  BUN less than 5.  Creatinine 0.58.  Blood sugar 115, calcium 8.3.



ASSESSMENT: 

1.   Small bowel obstruction status post exploratory laparotomy for lysis of 
adhesions,

      performed by Dr. Donald Behr, general surgeon, postoperative day #1.

2.   Dehydration secondary to #1, improving. .

3.   Hypertension, stable.

4.   Chronic cervical neck pain.

5.   Hyperlipidemia.

6.   History of breast cancer with left mastectomy.



PLAN:  Will continue to refer to Dr. Behr for postoperative management.  I 
believe he is going to camp NG tube today.  She is on some ice chips, clear 
liquids.  She is encouraged to ambulate.  We will continue to encourage 
aggressive bronchial hygiene with incentive spirometry.  She remains on DVT 
prophylaxis per protocol.  Hopefully, she will progress well and we can 
discharge over the weekend.  Until then, we will continue to defer to Dr. Behr 
for further treatment and will continue to monitor and treat as needed.



#[id number]

[facility]